# Patient Record
Sex: MALE | Race: WHITE | NOT HISPANIC OR LATINO | Employment: FULL TIME | URBAN - NONMETROPOLITAN AREA
[De-identification: names, ages, dates, MRNs, and addresses within clinical notes are randomized per-mention and may not be internally consistent; named-entity substitution may affect disease eponyms.]

---

## 2017-01-04 ENCOUNTER — OFFICE VISIT (OUTPATIENT)
Dept: FAMILY MEDICINE | Facility: CLINIC | Age: 27
End: 2017-01-04

## 2017-01-04 VITALS
SYSTOLIC BLOOD PRESSURE: 106 MMHG | DIASTOLIC BLOOD PRESSURE: 64 MMHG | WEIGHT: 215 LBS | BODY MASS INDEX: 32.7 KG/M2 | HEART RATE: 79 BPM | RESPIRATION RATE: 18 BRPM | TEMPERATURE: 97.7 F | OXYGEN SATURATION: 98 %

## 2017-01-04 DIAGNOSIS — J02.9 ACUTE PHARYNGITIS, UNSPECIFIED ETIOLOGY: ICD-10-CM

## 2017-01-04 DIAGNOSIS — R07.0 THROAT PAIN: Primary | ICD-10-CM

## 2017-01-04 DIAGNOSIS — F17.200 TOBACCO USE DISORDER: ICD-10-CM

## 2017-01-04 LAB
DEPRECATED S PYO AG THROAT QL EIA: NORMAL
MICRO REPORT STATUS: NORMAL
SPECIMEN SOURCE: NORMAL

## 2017-01-04 PROCEDURE — 87081 CULTURE SCREEN ONLY: CPT | Mod: 90 | Performed by: FAMILY MEDICINE

## 2017-01-04 PROCEDURE — 99000 SPECIMEN HANDLING OFFICE-LAB: CPT | Performed by: FAMILY MEDICINE

## 2017-01-04 PROCEDURE — 99213 OFFICE O/P EST LOW 20 MIN: CPT | Performed by: FAMILY MEDICINE

## 2017-01-04 PROCEDURE — 87880 STREP A ASSAY W/OPTIC: CPT | Performed by: FAMILY MEDICINE

## 2017-01-04 NOTE — PATIENT INSTRUCTIONS
Viral Pharyngitis (Sore Throat)    You (or your child, if your child is the patient) have pharyngitis (sore throat). This infection is caused by a virus. It can cause throat pain that is worse when swallowing, aching all over, headache, and fever. The infection may be spread by coughing, kissing, or touching others after touching your mouth or nose. Antibiotic medications do not work against viruses, so they are not used for treating this condition.  Home care    If your symptoms are severe, rest at home. Return to work or school when you feel well enough.     Drink plenty of fluids to avoid dehydration.    For children: Use acetaminophen for fever, fussiness or discomfort. In infants over six months of age, you may use ibuprofen instead of acetaminophen. (NOTE: If your child has chronic liver or kidney disease or ever had a stomach ulcer or GI bleeding, talk with your doctor before using these medicines.) (NOTE: Aspirin should never be used in anyone under 18 years of age who is ill with a fever. It may cause severe liver damage.)     For adults: You may use acetaminophen or ibuprofen to control pain or fever, unless another medicine was prescribed for this. (NOTE: If you have chronic liver or kidney disease or ever had a stomach ulcer or GI bleeding, talk with your doctor before using these medicines.)    Throat lozenges or numbing throat sprays can help reduce pain. Gargling with warm salt water will also help reduce throat pain. For this, dissolve 1/2 teaspoon of salt in 1 glass of warm water. To help soothe a sore throat, children can sip on juice or a popsicle. Children 5 years and older can also suck on a lollipop or hard candy.    Avoid salty or spicy foods, which can be irritating to the throat.  Follow-up care  Follow up with your healthcare provider or our staff if you are not improving over the next week.  When to seek medical advice  Call your healthcare provider right away if any of these  occur:    Fever as directed by your doctor.  For children, seek care if:    Your child is of any age and has repeated fevers above 104 F (40 C).    Your child is younger than 2 years of age and has a fever of 100.4 F (38 C) that continues for more than 1 day.    Your child is 2 years old or older and has a fever of 100.4 F (38 C) that continues for more than 3 days.    New or worsening ear pain, sinus pain, or headache    Painful lumps in the back of neck    Stiff neck    Lymph nodes are getting larger    Inability to swallow liquids, excessive drooling, or inability to open mouth wide due to throat pain    Signs of dehydration (very dark urine or no urine, sunken eyes, dizziness)    Trouble breathing or noisy breathing    Muffled voice    New rash    Child appears to be getting sicker    5933-0741 [employer]. [address] [place] [address]. All rights reserved. This information is not intended as a substitute for professional medical care. Always follow your healthcare professional's instructions.

## 2017-01-04 NOTE — MR AVS SNAPSHOT
After Visit Summary   1/4/2017    Hussain Warren    MRN: [id number]           Patient Information     Date Of Birth          1990        Visit Information        Provider Department      1/4/2017 3:00 PM Lorne Greenwood MD [facility]        Today's Diagnoses     Throat pain    -  1     Acute pharyngitis, unspecified etiology         Tobacco use disorder           Care Instructions      Viral Pharyngitis (Sore Throat)    You (or your child, if your child is the patient) have pharyngitis (sore throat). This infection is caused by a virus. It can cause throat pain that is worse when swallowing, aching all over, headache, and fever. The infection may be spread by coughing, kissing, or touching others after touching your mouth or nose. Antibiotic medications do not work against viruses, so they are not used for treating this condition.  Home care    If your symptoms are severe, rest at home. Return to work or school when you feel well enough.     Drink plenty of fluids to avoid dehydration.    For children: Use acetaminophen for fever, fussiness or discomfort. In infants over six months of age, you may use ibuprofen instead of acetaminophen. (NOTE: If your child has chronic liver or kidney disease or ever had a stomach ulcer or GI bleeding, talk with your doctor before using these medicines.) (NOTE: Aspirin should never be used in anyone under 18 years of age who is ill with a fever. It may cause severe liver damage.)     For adults: You may use acetaminophen or ibuprofen to control pain or fever, unless another medicine was prescribed for this. (NOTE: If you have chronic liver or kidney disease or ever had a stomach ulcer or GI bleeding, talk with your doctor before using these medicines.)    Throat lozenges or numbing throat sprays can help reduce pain. Gargling with warm salt water will also help reduce throat pain. For this, dissolve 1/2 teaspoon of salt in 1 glass of warm  water. To help soothe a sore throat, children can sip on juice or a popsicle. Children 5 years and older can also suck on a lollipop or hard candy.    Avoid salty or spicy foods, which can be irritating to the throat.  Follow-up care  Follow up with your healthcare provider or our staff if you are not improving over the next week.  When to seek medical advice  Call your healthcare provider right away if any of these occur:    Fever as directed by your doctor.  For children, seek care if:    Your child is of any age and has repeated fevers above 104 F (40 C).    Your child is younger than 2 years of age and has a fever of 100.4 F (38 C) that continues for more than 1 day.    Your child is 2 years old or older and has a fever of 100.4 F (38 C) that continues for more than 3 days.    New or worsening ear pain, sinus pain, or headache    Painful lumps in the back of neck    Stiff neck    Lymph nodes are getting larger    Inability to swallow liquids, excessive drooling, or inability to open mouth wide due to throat pain    Signs of dehydration (very dark urine or no urine, sunken eyes, dizziness)    Trouble breathing or noisy breathing    Muffled voice    New rash    Child appears to be getting sicker    3493-1477 [employer]. [address] [place] [address]. All rights reserved. This information is not intended as a substitute for professional medical care. Always follow your healthcare professional's instructions.              Follow-ups after your visit        Who to contact     If you have questions or need follow up information about today's clinic visit or your schedule please contact [facility] directly at [phone].  Normal or non-critical lab and imaging results will be communicated to you by MyChart, letter or phone within 4 business days after the clinic has received the results. If you do not hear from us within 7 days, please contact the clinic through [employer] or  "phone. If you have a critical or abnormal lab result, we will notify you by phone as soon as possible.  Submit refill requests through [employer] or call your pharmacy and they will forward the refill request to us. Please allow 3 business days for your refill to be completed.          Additional Information About Your Visit        [facility] [employer] Information     [employer] lets you send messages to your doctor, view your test results, renew your prescriptions, schedule appointments and more. To sign up, go to [email or web address] . Click on \"Log in\" on the left side of the screen, which will take you to the Welcome page. Then click on \"Sign up Now\" on the right side of the page.     You will be asked to enter the access code listed below, as well as some personal information. Please follow the directions to create your username and password.     Your access code is: [email or web address]  Expires: 2017  3:25 PM     Your access code will  in 90 days. If you need help or a new code, please call your [facility] or [phone].        Care EveryWhere ID     This is your Care EveryWhere ID. This could be used by other organizations to access your [facility] medical records  [id number]        Your Vitals Were     Pulse Temperature Respirations Pulse Oximetry          79 97.7  F (36.5  C) (Tympanic) 18 98%         Blood Pressure from Last 3 Encounters:   17 106/64   16 138/82   11/19/15 127/76    Weight from Last 3 Encounters:   17 215 lb (97.523 kg)   16 212 lb (96.163 kg)   11/19/15 202 lb (91.627 kg)              We Performed the Following     Beta strep group A culture     Strep, Rapid Screen        Primary Care Provider Office Phone # Fax #    Darnell Lockhart [phone]       [facility] [address] [facility] [address]        Thank you!     Thank you for choosing [facility]  for your care. Our goal is always to provide you with excellent " care. Hearing back from our patients is one way we can continue to improve our services. Please take a few minutes to complete the written survey that you may receive in the mail after your visit with us. Thank you!             Your Updated Medication List - Protect others around you: Learn how to safely use, store and throw away your medicines at www.disposemymeds.org.      Notice  As of 1/4/2017  3:25 PM    You have not been prescribed any medications.

## 2017-01-04 NOTE — PROGRESS NOTES
SUBJECTIVE:                                                    Hussain Warren is a 26 year old male who presents to clinic today for the following health issues:      RESPIRATORY SYMPTOMS      Duration: 5 days    Description  sore throat, cough, fever and chills, body aches    Severity: moderate    Accompanying signs and symptoms: None    History (predisposing factors):  none    Precipitating or alleviating factors: None    Therapies tried and outcome:  OTC cold and cough medication         Problem list and histories reviewed & adjusted, as indicated.  Additional history: as documented    Patient Active Problem List   Diagnosis     Other acne     Anxiety     Insomnia     Bipolar 1 disorder (H)     ADHD (attention deficit hyperactivity disorder)     Tobacco use     Elevated BP     CARDIOVASCULAR SCREENING; LDL GOAL LESS THAN 160     Corneal ulcer of left eye     Past Surgical History   Procedure Laterality Date     Orthopedic surgery       boxer's fracture at age 15       Social History   Substance Use Topics     Smoking status: Current Some Day Smoker -- 0.40 packs/day     Types: Cigarettes     Smokeless tobacco: Never Used     Alcohol Use: Yes      Comment: Occ     Family History   Problem Relation Age of Onset     Hypertension Father      gout     DIABETES Paternal Grandmother      type 2, Alzheimers     Hypertension Paternal Grandfather      DIABETES Paternal Uncle      Hypertension Paternal Uncle      Hypertension Paternal Aunt      Hypertension Paternal Aunt      Breast Cancer Paternal Aunt          No current outpatient prescriptions on file.     No Known Allergies  Recent Labs   Lab Test  02/24/15   0947  03/04/13   1236   CR  1.14   --    GFRESTIMATED  78   --    GFRESTBLACK  >90   GFR Calc     --    POTASSIUM  4.5   --    TSH  2.14  1.55      BP Readings from Last 3 Encounters:   01/04/17 106/64   08/16/16 138/82   11/19/15 127/76    Wt Readings from Last 3 Encounters:   01/04/17 215 lb  (97.523 kg)   08/16/16 212 lb (96.163 kg)   11/19/15 202 lb (91.627 kg)                  Problem list, Medication list, Allergies, and Medical/Social/Surgical histories reviewed in [facility] and updated as appropriate.    ROS:  Constitutional, HEENT, cardiovascular, pulmonary, gi and gu systems are negative, except as otherwise noted.    OBJECTIVE:                                                    /64 mmHg  Pulse 79  Temp(Src) 97.7  F (36.5  C) (Tympanic)  Resp 18  Wt 215 lb (97.523 kg)  SpO2 98%  Body mass index is 32.7 kg/(m^2).  GENERAL: alert and no distress  EYES: Eyes grossly normal to inspection, PERRL and conjunctivae and sclerae normal  HENT: normal cephalic/atraumatic, ear canals and TM's normal, nose and mouth without ulcers or lesions, oral mucous membranes moist and oropharxnx crowded  NECK: no adenopathy, no asymmetry, masses, or scars and thyroid normal to palpation  RESP: lungs clear to auscultation - no rales, rhonchi or wheezes  CV: regular rate and rhythm, normal S1 S2, no S3 or S4, no murmur, click or rub, no peripheral edema and peripheral pulses strong  ABDOMEN: soft, nontender, no hepatosplenomegaly, no masses and bowel sounds normal  MS: no gross musculoskeletal defects noted, no edema    Diagnostic Test Results:  Results for orders placed or performed in visit on 01/04/17 (from the past 24 hour(s))   Strep, Rapid Screen   Result Value Ref Range    Specimen Description Throat     Rapid Strep A Screen       NEGATIVE: No Group A streptococcal antigen detected by immunoassay, await   culture report.      Micro Report Status FINAL 01/04/2017         ASSESSMENT/PLAN:                                                          ICD-10-CM    1. Throat pain R07.0 Strep, Rapid Screen     Beta strep group A culture   2. Acute pharyngitis, unspecified etiology J02.9    3. Tobacco use disorder F17.200        Discussed in detail differentials and further management. Symptoms are likely secondary to  viral infection. Recommended well hydration, over-the-counter analgesia, warm fluids and saline gargles. Smoking cessation counseling provided, not ready to quit currently. Patient understood and in agreement with the above plan. All questions are answered. Follow-up if symptoms persist or worsen.      Lorne Greenwood MD  [facility]

## 2017-01-04 NOTE — NURSING NOTE
"Chief Complaint   Patient presents with     URI       Initial /64 mmHg  Pulse 79  Temp(Src) 97.7  F (36.5  C) (Tympanic)  Resp 18  Wt 215 lb (97.523 kg)  SpO2 98% Estimated body mass index is 32.7 kg/(m^2) as calculated from the following:    Height as of 8/16/16: 5' 8\" (1.727 m).    Weight as of this encounter: 215 lb (97.523 kg).  BP completed using cuff size: large    "

## 2017-01-05 ENCOUNTER — TELEPHONE (OUTPATIENT)
Dept: FAMILY MEDICINE | Facility: CLINIC | Age: 27
End: 2017-01-05

## 2017-01-05 NOTE — TELEPHONE ENCOUNTER
Reason for Call:  Other note for work    Detailed comments: He was seen yesterday by Dr Greenwood.  He needs a note saying that he is okay to return to work today.  He needs the note faxed to [phone].    Phone Number Patient can be reached at: Home number on file [phone] (home)    Best Time: any    Can we leave a detailed message on this number? YES    Call taken on 1/5/2017 at 9:37 AM by Wendy Gabriel

## 2017-01-05 NOTE — Clinical Note
[place] [address] [facility] [address]  Phone: [phone]  Fax: [phone]    January 5, 2017    Hussain Warren  [address]  [facility] [address]            To whom it may concern,          Hussain Warren was seen in clinic 01-04-17 for illness. Per his discretion/ symptoms    he may return to work today, 01-05-17.             Sincerely,      Lorne Greenwood MD/ francine

## 2017-01-05 NOTE — TELEPHONE ENCOUNTER
Per 01-04-16 OV-       ICD-10-CM      1.  Throat pain  R07.0  Strep, Rapid Screen        Beta strep group A culture    2.  Acute pharyngitis, unspecified etiology  J02.9      3.  Tobacco use disorder  F17.200          Discussed in detail differentials and further management. Symptoms are likely secondary to viral infection. Recommended well hydration, over-the-counter analgesia, warm fluids and saline gargles. Smoking cessation counseling provided, not ready to quit currently. Patient understood and in agreement with the above plan. All questions are answered. Follow-up if symptoms persist or worsen.      Lorne Greenwood MD  [facility]               Spoke with pt who states still feeling ill however needed to return to work today financially.  Return to work letter issued, signed by MD.  CSS to fax letter to employer.  JASPREET Romero RN

## 2017-01-06 LAB
BACTERIA SPEC CULT: NORMAL
MICRO REPORT STATUS: NORMAL
SPECIMEN SOURCE: NORMAL

## 2017-04-28 DIAGNOSIS — Z31.41 FERTILITY TESTING: Primary | ICD-10-CM

## 2019-01-19 ENCOUNTER — HOSPITAL ENCOUNTER (EMERGENCY)
Facility: CLINIC | Age: 29
Discharge: HOME | End: 2019-01-19
Attending: PHYSICIAN ASSISTANT | Admitting: PHYSICIAN ASSISTANT
Payer: MEDICAID

## 2019-01-19 ENCOUNTER — APPOINTMENT (OUTPATIENT)
Dept: GENERAL RADIOLOGY | Facility: CLINIC | Age: 29
End: 2019-01-19
Payer: MEDICAID

## 2019-01-19 VITALS
RESPIRATION RATE: 18 BRPM | OXYGEN SATURATION: 98 % | DIASTOLIC BLOOD PRESSURE: 85 MMHG | TEMPERATURE: 98.3 F | SYSTOLIC BLOOD PRESSURE: 142 MMHG

## 2019-01-19 DIAGNOSIS — S92.351A CLOSED DISPLACED FRACTURE OF FIFTH METATARSAL BONE OF RIGHT FOOT, INITIAL ENCOUNTER: ICD-10-CM

## 2019-01-19 PROCEDURE — 73630 X-RAY EXAM OF FOOT: CPT | Mod: RT

## 2019-01-19 PROCEDURE — 28470 CLTX METATARSAL FX WO MNP EA: CPT | Mod: T9 | Performed by: PHYSICIAN ASSISTANT

## 2019-01-19 PROCEDURE — 99214 OFFICE O/P EST MOD 30 MIN: CPT | Mod: 25 | Performed by: PHYSICIAN ASSISTANT

## 2019-01-19 PROCEDURE — 28470 CLTX METATARSAL FX WO MNP EA: CPT | Mod: 54 | Performed by: PHYSICIAN ASSISTANT

## 2019-01-19 PROCEDURE — G0463 HOSPITAL OUTPT CLINIC VISIT: HCPCS | Mod: 25 | Performed by: PHYSICIAN ASSISTANT

## 2019-01-19 RX ORDER — HYDROCODONE BITARTRATE AND ACETAMINOPHEN 5; 325 MG/1; MG/1
1-2 TABLET ORAL EVERY 6 HOURS PRN
Qty: 10 TABLET | Refills: 0 | Status: SHIPPED | OUTPATIENT
Start: 2019-01-19 | End: 2019-02-18

## 2019-01-19 ASSESSMENT — ENCOUNTER SYMPTOMS
CONSTITUTIONAL NEGATIVE: 1
NEUROLOGICAL NEGATIVE: 1

## 2019-01-19 NOTE — ED PROVIDER NOTES
History     Chief Complaint   Patient presents with     Foot Pain     right, was kicking snow off car     HPI  Hussain Warren is a 28 year old male who presents with complaints of right foot pain and swelling today.  Patient states he attempted to kick off a chunk of snow/ice off of his significant other's vehicle today when he developed sudden pain to the lateral aspect of his right foot.  His pain has worsened throughout the day along with associated swelling of the area.  His foot pain is worse with weightbearing.        Allergies:  No Known Allergies    Problem List:    Patient Active Problem List    Diagnosis Date Noted     Corneal ulcer of left eye 06/24/2015     Priority: Medium     Tobacco use 02/24/2015     Priority: Medium     Elevated BP 02/24/2015     Priority: Medium     CARDIOVASCULAR SCREENING; LDL GOAL LESS THAN 160 02/24/2015     Priority: Medium     Insomnia 03/04/2013     Priority: Medium     Bipolar 1 disorder (H) 03/04/2013     Priority: Medium     ADHD (attention deficit hyperactivity disorder) 03/04/2013     Priority: Medium     Anxiety 02/28/2011     Priority: Medium     Other acne 12/08/2005     Priority: Medium        Past Medical History:    Past Medical History:   Diagnosis Date     Back pain      Depressive disorder      Other acne        Past Surgical History:    Past Surgical History:   Procedure Laterality Date     ORTHOPEDIC SURGERY      boxer's fracture at age 15       Family History:    Family History   Problem Relation Age of Onset     Hypertension Father         gout     Diabetes Paternal Grandmother         type 2, Alzheimers     Hypertension Paternal Grandfather      Diabetes Paternal Uncle      Hypertension Paternal Uncle      Hypertension Paternal Aunt      Hypertension Paternal Aunt      Breast Cancer Paternal Aunt        Social History:  Marital Status:  Single [1]  Social History     Tobacco Use     Smoking status: Current Some Day Smoker     Packs/day: 0.40     Types:  Cigarettes     Smokeless tobacco: Never Used   Substance Use Topics     Alcohol use: Yes     Comment: Occ     Drug use: No        Medications:      HYDROcodone-acetaminophen (NORCO) 5-325 MG tablet         Review of Systems   Constitutional: Negative.    Musculoskeletal:        Right foot pain and swelling   Skin: Negative.    Neurological: Negative.    All other systems reviewed and are negative.      Physical Exam   BP: 142/85  Heart Rate: 97  Temp: 98.3  F (36.8  C)  Resp: 18  SpO2: 98 %      Physical Exam   Constitutional: He appears well-developed and well-nourished. No distress.   HENT:   Head: Normocephalic and atraumatic.   Eyes: Conjunctivae are normal.   Neck: Neck supple.   Cardiovascular: Intact distal pulses.   Pulmonary/Chest: Effort normal.   Musculoskeletal:        Right ankle: Normal. He exhibits normal range of motion, no swelling and no ecchymosis. No tenderness.        Right foot: There is decreased range of motion, tenderness, bony tenderness and swelling. There is normal capillary refill, no crepitus, no deformity and no laceration.   Diffuse tenderness of right foot extending from right fifth toe and especially along fifth metatarsal.  There is associated swelling of his foot.  No overlying skin changes.   Neurological: He is alert. He has normal strength. No sensory deficit.   Skin: Skin is warm and dry. No rash noted.       ED Course        Procedures    Results for orders placed or performed during the hospital encounter of 01/19/19 (from the past 24 hour(s))   Foot  XR, G/E 3 views, right    Narrative    RIGHT FOOT THREE OR MORE VIEWS 1/19/2019 6:09 PM     HISTORY: Kicked ice, tenderness along fifth toe and metatarsal.    COMPARISON: 11/24/2018      Impression    IMPRESSION: An acute nondisplaced fracture is present involving the  base of the fifth metatarsal extending into the tarsometatarsal joint.  Mild associated soft tissue swelling is present. No other fractures  are  identified.    MILY IBARRA MD       Medications - No data to display    Assessments & Plan (with Medical Decision Making)     Pt is a 28 year old male who presents with complaints of right foot pain and swelling today.  Patient states he attempted to kick off a chunk of snow/ice off of his significant other's vehicle today when he developed sudden pain to the lateral aspect of his right foot.  His pain has worsened throughout the day along with associated swelling of the area.  His foot pain is worse with weightbearing.  Pt is afebrile on arrival.  Exam as above.  X-rays of right foot show an acute nondisplaced fracture involving the base of the fifth metatarsal.  Discussed results with patient.  Patient was placed in a cam boot with instructions to be non-weightbearing with crutches until evaluated by orthopedics.  Return precautions were reviewed.  Hand-outs were provided.    Patient was sent with [facility] and was instructed to follow-up with orthopedics in 3-5 days for continued care and management (referral was made).  He is to return to the ED for persistent and/or worsening symptoms.  Patient expressed understanding of the diagnosis and plan and was discharged home in good condition.    I have reviewed the nursing notes.    I have reviewed the findings, diagnosis, plan and need for follow up with the patient.       Medication List      Started    HYDROcodone-acetaminophen 5-325 MG tablet  Commonly known as:  NORCO  1-2 tablets, Oral, EVERY 6 HOURS PRN            Final diagnoses:   Closed displaced fracture of fifth metatarsal bone of right foot, initial encounter       1/19/2019   [facility] EMERGENCY DEPARTMENT      Disclaimer:  This note consists of symbols derived from keyboarding, dictation and/or voice recognition software.  As a result, there may be errors in the script that have gone undetected.  Please consider this when interpreting information found in this chart.     Natalia Sequeira,  KALI  01/19/19 1948

## 2019-01-19 NOTE — ED AVS SNAPSHOT
[facility] Emergency Department  [address] [facility] [address]  Phone:  [phone]  Fax:  [phone]                                    Hussain Warren   MRN: [id number]    Department:  [facility] Emergency Department   Date of Visit:  1/19/2019           After Visit Summary Signature Page    I have received my discharge instructions, and my questions have been answered. I have discussed any challenges I see with this plan with the nurse or doctor.    ..........................................................................................................................................  Patient/Patient Representative Signature      ..........................................................................................................................................  Patient Representative Print Name and Relationship to Patient    ..................................................               ................................................  Date                                   Time    ..........................................................................................................................................  Reviewed by Signature/Title    ...................................................              ..............................................  Date                                               Time          22EPIC Rev 08/18

## 2019-01-23 ENCOUNTER — OFFICE VISIT (OUTPATIENT)
Dept: FAMILY MEDICINE | Facility: CLINIC | Age: 29
End: 2019-01-23
Payer: MEDICAID

## 2019-01-23 VITALS
WEIGHT: 250 LBS | HEART RATE: 87 BPM | OXYGEN SATURATION: 96 % | HEIGHT: 71 IN | DIASTOLIC BLOOD PRESSURE: 88 MMHG | SYSTOLIC BLOOD PRESSURE: 148 MMHG | TEMPERATURE: 97.4 F | RESPIRATION RATE: 24 BRPM | BODY MASS INDEX: 35 KG/M2

## 2019-01-23 DIAGNOSIS — S92.354D CLOSED NONDISPLACED FRACTURE OF FIFTH METATARSAL BONE OF RIGHT FOOT WITH ROUTINE HEALING, SUBSEQUENT ENCOUNTER: Primary | ICD-10-CM

## 2019-01-23 PROCEDURE — 99213 OFFICE O/P EST LOW 20 MIN: CPT | Performed by: FAMILY MEDICINE

## 2019-01-23 RX ORDER — HYDROCODONE BITARTRATE AND ACETAMINOPHEN 5; 325 MG/1; MG/1
1 TABLET ORAL EVERY 6 HOURS PRN
Qty: 12 TABLET | Refills: 0 | Status: SHIPPED | OUTPATIENT
Start: 2019-01-23 | End: 2019-02-18

## 2019-01-23 SDOH — HEALTH STABILITY: MENTAL HEALTH: HOW MANY STANDARD DRINKS CONTAINING ALCOHOL DO YOU HAVE ON A TYPICAL DAY?: 1 OR 2

## 2019-01-23 SDOH — HEALTH STABILITY: MENTAL HEALTH: HOW OFTEN DO YOU HAVE A DRINK CONTAINING ALCOHOL?: MONTHLY OR LESS

## 2019-01-23 ASSESSMENT — MIFFLIN-ST. JEOR: SCORE: 2116.12

## 2019-01-23 ASSESSMENT — PAIN SCALES - GENERAL: PAINLEVEL: EXTREME PAIN (8)

## 2019-01-23 NOTE — PROGRESS NOTES
SUBJECTIVE:   Hussain Warren is a 28 year old male who presents to clinic today for the following health issues:      ED/UC Followup:    Facility:  [facility]  Date of visit: 2019  Reason for visit: Closed displaced fracture of fifth metatarsal bone of right foot, initial encounter   Current Status:pain poorly controlled          Problem list and histories reviewed & adjusted, as indicated.  Additional history: as documented    Patient Active Problem List   Diagnosis     Other acne     Anxiety     Insomnia     Bipolar 1 disorder (H)     ADHD (attention deficit hyperactivity disorder)     Tobacco use     Elevated BP     CARDIOVASCULAR SCREENING; LDL GOAL LESS THAN 160     Corneal ulcer of left eye     Past Surgical History:   Procedure Laterality Date     ORTHOPEDIC SURGERY      boxer's fracture at age 15       Social History     Tobacco Use     Smoking status: Former Smoker     Packs/day: 0.40     Types: Cigarettes     Last attempt to quit: 2018     Years since quittin.4     Smokeless tobacco: Never Used   Substance Use Topics     Alcohol use: Yes     Frequency: Monthly or less     Drinks per session: 1 or 2     Comment: Occ     Family History   Problem Relation Age of Onset     Hypertension Father         gout     Diabetes Paternal Grandmother         type 2, Alzheimers     Hypertension Paternal Grandfather      Diabetes Paternal Uncle      Hypertension Paternal Uncle      Hypertension Paternal Aunt      Hypertension Paternal Aunt      Breast Cancer Paternal Aunt          No current outpatient medications on file.     No Known Allergies  Recent Labs   Lab Test 02/24/15  0947 13  1236   CR 1.14  --    GFRESTIMATED 78  --    GFRESTBLACK >90   GFR Calc    --    POTASSIUM 4.5  --    TSH 2.14 1.55      BP Readings from Last 3 Encounters:   19 148/88   19 142/85   17 106/64    Wt Readings from Last 3 Encounters:   19 113.4 kg (250 lb)   17 97.5 kg  "(215 lb)   08/16/16 96.2 kg (212 lb)                  Labs reviewed in EPIC    Reviewed and updated as needed this visit by clinical staff       Reviewed and updated as needed this visit by Provider         ROS:  Constitutional, HEENT, cardiovascular, pulmonary, gi and gu systems are negative, except as otherwise noted.    OBJECTIVE:     /88   Pulse 87   Temp 97.4  F (36.3  C) (Tympanic)   Resp 24   Ht 1.803 m (5' 11\")   Wt 113.4 kg (250 lb)   SpO2 96%   BMI 34.87 kg/m     Body mass index is 34.87 kg/m .  GENERAL: alert and no distress  NECK: no adenopathy, no asymmetry, masses, or scars and thyroid normal to palpation  RESP: lungs clear to auscultation - no rales, rhonchi or wheezes  CV: regular rate and rhythm, normal S1 S2, no S3 or S4, no murmur, click or rub, no peripheral edema and peripheral pulses strong  ABDOMEN: soft, nontender, no hepatosplenomegaly, no masses and bowel sounds normal  MS: right foot: swollen and tender laterally, no significant skin discoloration noted, pulses 3+, sensation to touch and pressure intact  SKIN: normal exam     RIGHT FOOT THREE OR MORE VIEWS 1/19/2019 6:09 PM      HISTORY: Kicked ice, tenderness along fifth toe and metatarsal.     COMPARISON: 11/24/2018                                                                      IMPRESSION: An acute nondisplaced fracture is present involving the  base of the fifth metatarsal extending into the tarsometatarsal joint.  Mild associated soft tissue swelling is present. No other fractures  are identified.    ASSESSMENT/PLAN:         ICD-10-CM    1. Closed nondisplaced fracture of fifth metatarsal bone of right foot with routine healing, subsequent encounter S92.354D HYDROcodone-acetaminophen (NORCO) 5-325 MG tablet     28-year-old male presents with poorly controlled right foot pain, sustained a closed nondisplaced fifth metatarsal bone fracture of right foot, was seen in ER on January 19, 2019.  Physical examination as " described above.  Norco refilled, common side effects discussed.  Suggested to continue the Cam walker boot, ibuprofen, rest and minimal weightbearing.  Patient has an appointment with orthopedic tomorrow.  Written information provided.  All questions answered.      Patient Instructions     Patient Education     Foot Fracture  You have a broken bone (fracture) in your foot. This will cause pain, swelling, and often bruising. It will usually take about 4 to 8 weeks to heal. A foot fracture may be treated with a special shoe, splint, cast, or boot.  Home care  Follow these guidelines when caring for yourself at home:    You may be given a splint, cast, shoe, or boot to keep the injured area from moving. Unless you were told otherwise, use crutches or a walker. Don t put weight on the injured foot until your health care provider says you can do so. (You can rent crutches and a walker at many pharmacies and surgical or orthopedic supply stores.) Don t put weight on a splint, or it will break.    Keep your leg elevated to reduce pain and swelling. When sleeping, put a pillow under the injured leg. When sitting, support the injured leg so it is above your waist. This is very important during the first 2 days (48 hours).    Put an ice pack on the injured area. Do this for 20 minutes every 1 to 2 hours the first day for pain relief. You can make an ice pack by wrapping a plastic bag of ice cubes in a thin towel. As the ice melts, be careful that the splint, cast, boot, or shoe doesn t get wet. You can place the ice pack directly over the splint or cast. Unless told otherwise, you can open the boot or shoe to apply the ice pack. Continue using the ice pack 3 to 4 times a day for the next 2 days. Then use the ice pack as needed to ease pain and swelling.    Keep the splint, cast, boot, or shoe dry. When bathing, protect it with a large plastic bag, rubber-banded at the top end. If a fiberglass splint or cast or boot gets wet,  you can dry it with a hair dryer. Unless told otherwise, you can take off the boot or shoe to bathe.    You may use acetaminophen or ibuprofen to control pain, unless another pain medicine was prescribed. If you have chronic liver or kidney disease, talk with your healthcare provider before using these medicines. Also talk with your provider if you ve had a stomach ulcer or gastrointestinal bleeding.    Don t put creams or objects under the cast if you have itching.  Follow-up care  Follow up with your healthcare provider, or as advised. This is to make sure the bone is healing the way it should. If you were given a splint, it may be changed to a cast or boot at your follow-up visit.  X-rays may be taken. You will be told of any new findings that may affect your care.  When to seek medical advice  Call your healthcare provider right away if any of these occur:    The cast or splint cracks    The plaster cast or splint becomes wet or soft    The fiberglass cast or splint stays wet for more than 24 hours    Bad odor from the cast or wound fluid stains the cast    Tightness or pain under the cast or splint gets worse    Toes become swollen, cold, blue, numb, or tingly    You can t move your toes    Skin around cast or splint becomes red    Fever of 100.4 F (38 C) or higher, or as directed by your healthcare provider  Date Last Reviewed: 2/1/2017 2000-2018 [employer]. [address] [place] [address]. All rights reserved. This information is not intended as a substitute for professional medical care. Always follow your healthcare professional's instructions.               Lorne Greenwood MD  [facility]

## 2019-01-23 NOTE — NURSING NOTE
"Chief Complaint   Patient presents with     ER F/U       Initial There were no vitals taken for this visit. Estimated body mass index is 32.69 kg/m  as calculated from the following:    Height as of 8/16/16: 1.727 m (5' 8\").    Weight as of 1/4/17: 97.5 kg (215 lb).    Patient presents to the clinic using No DME    Health Maintenance that is potentially due pending provider review:  NONE    n/a    Is there anyone who you would like to be able to receive your results? not asked  If yes have patient fill out MURIEL    "

## 2019-01-23 NOTE — PATIENT INSTRUCTIONS
Patient Education     Foot Fracture  You have a broken bone (fracture) in your foot. This will cause pain, swelling, and often bruising. It will usually take about 4 to 8 weeks to heal. A foot fracture may be treated with a special shoe, splint, cast, or boot.  Home care  Follow these guidelines when caring for yourself at home:    You may be given a splint, cast, shoe, or boot to keep the injured area from moving. Unless you were told otherwise, use crutches or a walker. Don t put weight on the injured foot until your health care provider says you can do so. (You can rent crutches and a walker at many pharmacies and surgical or orthopedic supply stores.) Don t put weight on a splint, or it will break.    Keep your leg elevated to reduce pain and swelling. When sleeping, put a pillow under the injured leg. When sitting, support the injured leg so it is above your waist. This is very important during the first 2 days (48 hours).    Put an ice pack on the injured area. Do this for 20 minutes every 1 to 2 hours the first day for pain relief. You can make an ice pack by wrapping a plastic bag of ice cubes in a thin towel. As the ice melts, be careful that the splint, cast, boot, or shoe doesn t get wet. You can place the ice pack directly over the splint or cast. Unless told otherwise, you can open the boot or shoe to apply the ice pack. Continue using the ice pack 3 to 4 times a day for the next 2 days. Then use the ice pack as needed to ease pain and swelling.    Keep the splint, cast, boot, or shoe dry. When bathing, protect it with a large plastic bag, rubber-banded at the top end. If a fiberglass splint or cast or boot gets wet, you can dry it with a hair dryer. Unless told otherwise, you can take off the boot or shoe to bathe.    You may use acetaminophen or ibuprofen to control pain, unless another pain medicine was prescribed. If you have chronic liver or kidney disease, talk with your healthcare provider  before using these medicines. Also talk with your provider if you ve had a stomach ulcer or gastrointestinal bleeding.    Don t put creams or objects under the cast if you have itching.  Follow-up care  Follow up with your healthcare provider, or as advised. This is to make sure the bone is healing the way it should. If you were given a splint, it may be changed to a cast or boot at your follow-up visit.  X-rays may be taken. You will be told of any new findings that may affect your care.  When to seek medical advice  Call your healthcare provider right away if any of these occur:    The cast or splint cracks    The plaster cast or splint becomes wet or soft    The fiberglass cast or splint stays wet for more than 24 hours    Bad odor from the cast or wound fluid stains the cast    Tightness or pain under the cast or splint gets worse    Toes become swollen, cold, blue, numb, or tingly    You can t move your toes    Skin around cast or splint becomes red    Fever of 100.4 F (38 C) or higher, or as directed by your healthcare provider  Date Last Reviewed: 2/1/2017 2000-2018 [employer]. [address] [place] [address], [place] [address]. All rights reserved. This information is not intended as a substitute for professional medical care. Always follow your healthcare professional's instructions.

## 2019-01-24 ENCOUNTER — OFFICE VISIT (OUTPATIENT)
Dept: PODIATRY | Facility: CLINIC | Age: 29
End: 2019-01-24
Payer: MEDICAID

## 2019-01-24 VITALS
HEIGHT: 71 IN | DIASTOLIC BLOOD PRESSURE: 79 MMHG | SYSTOLIC BLOOD PRESSURE: 147 MMHG | WEIGHT: 250 LBS | HEART RATE: 93 BPM | BODY MASS INDEX: 35 KG/M2

## 2019-01-24 DIAGNOSIS — S92.354A CLOSED NONDISPLACED FRACTURE OF FIFTH METATARSAL BONE OF RIGHT FOOT, INITIAL ENCOUNTER: Primary | ICD-10-CM

## 2019-01-24 PROCEDURE — 99203 OFFICE O/P NEW LOW 30 MIN: CPT | Performed by: PODIATRIST

## 2019-01-24 ASSESSMENT — PAIN SCALES - GENERAL: PAINLEVEL: MODERATE PAIN (4)

## 2019-01-24 ASSESSMENT — MIFFLIN-ST. JEOR: SCORE: 2116.12

## 2019-01-24 NOTE — PROGRESS NOTES
PATIENT HISTORY:  Hussain Warren is a 28 year old male who presents to clinic with a chief complaint of a painful right foot.  The patient relates the pain is located on the outside  aspect on the right foot.  The patient relates injuring the foot on Saturday while kicking ice off the car.  The patient was seen by the ER with x-rays revealing a nondisplaced fracture at the base of the fifth metatarsal on the right foot.  The patient was splinted and instructed on non weight bearing with crutches.    REVIEW OF SYSTEMS:  Constitutional, HEENT, cardiovascular, pulmonary, GI, , musculoskeletal, neuro, skin, endocrine and psych systems are negative, except as otherwise noted.     PAST MEDICAL HISTORY:   Past Medical History:   Diagnosis Date     Back pain      Depressive disorder      Other acne         PAST SURGICAL HISTORY:   Past Surgical History:   Procedure Laterality Date     ORTHOPEDIC SURGERY      boxer's fracture at age 15        MEDICATIONS:   Current Outpatient Medications:      HYDROcodone-acetaminophen (NORCO) 5-325 MG tablet, Take 1 tablet by mouth every 6 hours as needed for pain, Disp: 12 tablet, Rfl: 0     ALLERGIES:  No Known Allergies     SOCIAL HISTORY:   Social History     Socioeconomic History     Marital status: Single     Spouse name: Not on file     Number of children: Not on file     Years of education: Not on file     Highest education level: Not on file   Social Needs     Financial resource strain: Not on file     Food insecurity - worry: Not on file     Food insecurity - inability: Not on file     Transportation needs - medical: Not on file     Transportation needs - non-medical: Not on file   Occupational History     Not on file   Tobacco Use     Smoking status: Former Smoker     Packs/day: 0.40     Types: Cigarettes     Last attempt to quit: 2018     Years since quittin.4     Smokeless tobacco: Never Used   Substance and Sexual Activity     Alcohol use: Yes     Frequency:  Monthly or less     Drinks per session: 1 or 2     Comment: Occ     Drug use: No     Sexual activity: Yes   Other Topics Concern      Service No     Blood Transfusions No     Caffeine Concern No     Comment: none during wrestling season     Occupational Exposure No     Hobby Hazards Yes     Comment: high school wrestler     Sleep Concern No     Stress Concern No     Weight Concern No     Special Diet Yes     Comment: wrestler     Back Care No     Exercise Not Asked     Bike Helmet Yes     Seat Belt Yes     Self-Exams Not Asked     Parent/sibling w/ CABG, MI or angioplasty before 65F 55M? No   Social History Narrative     Not on file        FAMILY HISTORY:   Family History   Problem Relation Age of Onset     Hypertension Father         gout     Diabetes Paternal Grandmother         type 2, Alzheimers     Hypertension Paternal Grandfather      Diabetes Paternal Uncle      Hypertension Paternal Uncle      Hypertension Paternal Aunt      Hypertension Paternal Aunt      Breast Cancer Paternal Aunt         EXAM:Vitals: There were no vitals taken for this visit.  BMI= There is no height or weight on file to calculate BMI.      General appearance: Patient is alert and fully cooperative with history & exam.  No sign of distress is noted during the visit.     Psychiatric: Affect is pleasant & appropriate.  Patient appears motivated to improve health.     Respiratory: Breathing is regular & unlabored while sitting.     HEENT: Hearing is intact to spoken word.  Speech is clear.  No gross evidence of visual impairment that would impact ambulation.     Dermatologic: Skin is intact to both lower extremities without significant lesions, rash or abrasion.  No paronychia or evidence of soft tissue infection is noted.     Vascular: DP & PT pulses are intact & regular bilaterally.  No significant edema or varicosities noted.  CFT and skin temperature is normal to both lower extremities.     Neurologic: Lower extremity sensation  is intact to light touch.  No evidence of weakness or contracture in the lower extremities.  No evidence of neuropathy.     Musculoskeletal: Patient is non-ambulatory with crutches.  No gross ankle deformity noted.  No foot or ankle joint effusion is noted.    One notes positive edema, positive ecchymosis.  One notes pain with palpation over the lateral aspect overlying the base of the fifth metatarsal  on the right.    Radiograph review of previous films including non weightbearing AP, lateral and medial oblique views of the right foot reveals an apparent closed nondisplaced fracture of the base of the fifth metatarsal.  All joint margins appear stable.  There is no apparent tumor formation noted.  There is no evidence of foreign body.    Assessment:  1.  Closed nondisplaced fracture of the base of the fifth metatarsal  of the right foot.    Plan:  I have explained to Hussain  about the conditions.  We discussed both conservative and surgical treatment options with all associated risks and benefits.  At this time, I do not believe there is need for surgery.  The patient will return to the office in one month for reevaluation and repeat x-rays.      Disclaimer: This note consists of symbols derived from keyboarding, dictation and/or voice recognition software. As a result, there may be errors in the script that have gone undetected. Please consider this when interpreting information found in this chart.       JEANE Capps D.P.M., [email or web address].

## 2019-01-24 NOTE — PATIENT INSTRUCTIONS
TOE & METATARSAL FRACTURES  The structure of the foot is complex, consisting of bones, muscles, tendons, and other soft tissues. Of the 26 bones in the foot, 19 are toe bones (phalanges) and metatarsal bones (the long bones in the midfoot). Fractures of the toe and metatarsal bones are common and require evaluation by a specialist. A foot and ankle surgeon should be seen for proper diagnosis and treatment, even if initial treatment has been received in an emergency room.  A fracture is a break in the bone. Fractures can be divided into two categories: traumatic fractures and stress fractures.  TRAUMATIC FRACTURES (also called acute fractures) are caused by a direct blow or impact, such as seriously stubbing your toe. Traumatic fractures can be displaced or non-displaced. If the fracture is displaced, the bone is broken in such a way that it has changed in position (dislocated).  Signs and symptoms of a traumatic fracture include:  You may hear a sound at the time of the break.    Pinpoint pain  (pain at the place of impact) at the time the fracture occurs and perhaps for a few hours later, but often the pain goes away after several hours.   Crooked or abnormal appearance of the toe.   Bruising and swelling the next day.   It is not true that  if you can walk on it, it s not broken.  Evaluation by a foot and ankle surgeon is always recommended.   STRESS FRACTURES are tiny, hairline breaks that are usually caused by repetitive stress. Stress fractures often afflict athletes who, for example, too rapidly increase their running mileage. They can also be caused by an abnormal foot structure, deformities, or osteoporosis. Improper footwear may also lead to stress fractures. Stress fractures should not be ignored. They require proper medical attention to heal correctly.  Symptoms of stress fractures include:  Pain with or after normal activity   Pain that goes away when resting and then returns when standing or during  activity    Pinpoint pain  (pain at the site of the fracture) when touched   Swelling, but no bruising   IMPROPER TREATMENT  Some people say that  the doctor can t do anything for a broken bone in the foot.  This is usually not true. In fact, if a fractured toe or metatarsal bone is not treated correctly, serious complications may develop. For example:  A deformity in the bony architecture which may limit the ability to move the foot or cause difficulty in fitting shoes   Arthritis, which may be caused by a fracture in a joint (the juncture where two bones meet), or may be a result of angular deformities that develop when a displaced fracture is severe or hasn t been properly corrected   Chronic pain and deformity   Non-union, or failure to heal, can lead to subsequent surgery or chronic pain.   PROPER TREATMENT FOR TOES  Fractures of the toe bones are almost always traumatic fractures. Treatment for traumatic fractures depends on the break itself and may include these options:  Rest. Sometimes rest is all that is needed to treat a traumatic fracture of the toe.   Splinting. The toe may be fitted with a splint to keep it in a fixed position.   Rigid or stiff-soled shoe. Wearing a stiff-soled shoe protects the toe and helps keep it properly positioned.    August taping  the fractured toe to another toe is sometimes appropriate, but in other cases it may be harmful.   Surgery. If the break is badly displaced or if the joint is affected, surgery may be necessary. Surgery often involves the use of fixation devices, such as pins.   PROPER TREATMENT OF METATARSALS  Breaks in the metatarsal bones may be either stress or traumatic fractures. Certain kinds of fractures of the metatarsal bones present unique challenges.  For example, sometimes a fracture of the first metatarsal bone (behind the big toe) can lead to arthritis. Since the big toe is used so frequently and bears more weight than other toes, arthritis in that area  can make it painful to walk, bend, or even stand.  Another type of break, called a Vasquez fracture, occurs at the base of the fifth metatarsal bone (behind the little toe). It is often misdiagnosed as an ankle sprain, and misdiagnosis can have serious consequences since sprains and fractures require different treatments. Your foot and ankle surgeon is an expert in correctly identifying these conditions as well as other problems of the foot.  Treatment of metatarsal fractures depends on the type and extent of the fracture, and may include:  Rest. Sometimes rest is the only treatment needed to promote healing of a stress or traumatic fracture of a metatarsal bone.   Avoid the offending activity. Because stress fractures result from repetitive stress, it is important to avoid the activity that led to the fracture. Crutches or a wheelchair are sometimes required to offload weight from the foot to give it time to heal.   Immobilization, casting, or rigid shoe. A stiff-soled shoe or other form of immobilization may be used to protect the fractured bone while it is healing.   Surgery. Some traumatic fractures of the metatarsal bones require surgery, especially if the break is badly displaced.   Follow-up care. Your foot and ankle surgeon will provide instructions for care following surgical or non-surgical treatment. Physical therapy, exercises and rehabilitation may be included in a schedule for return to normal activities.

## 2019-01-24 NOTE — LETTER
1/24/2019         RE: Hussain Warren  [address] [place] [id number]        Dear Colleague,    Thank you for referring your patient, Hussain Warren, to the [facility]. Please see a copy of my visit note below.    PATIENT HISTORY:  Hussain Warren is a 28 year old male who presents to clinic with a chief complaint of a painful right foot.  The patient relates the pain is located on the outside  aspect on the right foot.  The patient relates injuring the foot on Saturday while kicking ice off the car.  The patient was seen by the ER with x-rays revealing a nondisplaced fracture at the base of the fifth metatarsal on the right foot.  The patient was splinted and instructed on non weight bearing with crutches.    REVIEW OF SYSTEMS:  Constitutional, HEENT, cardiovascular, pulmonary, GI, , musculoskeletal, neuro, skin, endocrine and psych systems are negative, except as otherwise noted.     PAST MEDICAL HISTORY:   Past Medical History:   Diagnosis Date     Back pain      Depressive disorder      Other acne         PAST SURGICAL HISTORY:   Past Surgical History:   Procedure Laterality Date     ORTHOPEDIC SURGERY      boxer's fracture at age 15        MEDICATIONS:   Current Outpatient Medications:      HYDROcodone-acetaminophen (NORCO) 5-325 MG tablet, Take 1 tablet by mouth every 6 hours as needed for pain, Disp: 12 tablet, Rfl: 0     ALLERGIES:  No Known Allergies     SOCIAL HISTORY:   Social History     Socioeconomic History     Marital status: Single     Spouse name: Not on file     Number of children: Not on file     Years of education: Not on file     Highest education level: Not on file   Social Needs     Financial resource strain: Not on file     Food insecurity - worry: Not on file     Food insecurity - inability: Not on file     Transportation needs - medical: Not on file     Transportation needs - non-medical: Not on file   Occupational History     Not on file    Tobacco Use     Smoking status: Former Smoker     Packs/day: 0.40     Types: Cigarettes     Last attempt to quit: 2018     Years since quittin.4     Smokeless tobacco: Never Used   Substance and Sexual Activity     Alcohol use: Yes     Frequency: Monthly or less     Drinks per session: 1 or 2     Comment: Occ     Drug use: No     Sexual activity: Yes   Other Topics Concern      Service No     Blood Transfusions No     Caffeine Concern No     Comment: none during wrestling season     Occupational Exposure No     Hobby Hazards Yes     Comment: high school wrestler     Sleep Concern No     Stress Concern No     Weight Concern No     Special Diet Yes     Comment: wrestler     Back Care No     Exercise Not Asked     Bike Helmet Yes     Seat Belt Yes     Self-Exams Not Asked     Parent/sibling w/ CABG, MI or angioplasty before 65F 55M? No   Social History Narrative     Not on file        FAMILY HISTORY:   Family History   Problem Relation Age of Onset     Hypertension Father         gout     Diabetes Paternal Grandmother         type 2, Alzheimers     Hypertension Paternal Grandfather      Diabetes Paternal Uncle      Hypertension Paternal Uncle      Hypertension Paternal Aunt      Hypertension Paternal Aunt      Breast Cancer Paternal Aunt         EXAM:Vitals: There were no vitals taken for this visit.  BMI= There is no height or weight on file to calculate BMI.      General appearance: Patient is alert and fully cooperative with history & exam.  No sign of distress is noted during the visit.     Psychiatric: Affect is pleasant & appropriate.  Patient appears motivated to improve health.     Respiratory: Breathing is regular & unlabored while sitting.     HEENT: Hearing is intact to spoken word.  Speech is clear.  No gross evidence of visual impairment that would impact ambulation.     Dermatologic: Skin is intact to both lower extremities without significant lesions, rash or abrasion.  No paronychia  or evidence of soft tissue infection is noted.     Vascular: DP & PT pulses are intact & regular bilaterally.  No significant edema or varicosities noted.  CFT and skin temperature is normal to both lower extremities.     Neurologic: Lower extremity sensation is intact to light touch.  No evidence of weakness or contracture in the lower extremities.  No evidence of neuropathy.     Musculoskeletal: Patient is non-ambulatory with crutches.  No gross ankle deformity noted.  No foot or ankle joint effusion is noted.    One notes positive edema, positive ecchymosis.  One notes pain with palpation over the lateral aspect overlying the base of the fifth metatarsal  on the right.    Radiograph review of previous films including non weightbearing AP, lateral and medial oblique views of the right foot reveals an apparent closed nondisplaced fracture of the base of the fifth metatarsal.  All joint margins appear stable.  There is no apparent tumor formation noted.  There is no evidence of foreign body.    Assessment:  1.  Closed nondisplaced fracture of the base of the fifth metatarsal  of the right foot.    Plan:  I have explained to Hussain  about the conditions.  We discussed both conservative and surgical treatment options with all associated risks and benefits.  At this time, I do not believe there is need for surgery.  The patient will return to the office in one month for reevaluation and repeat x-rays.      Disclaimer: This note consists of symbols derived from keyboarding, dictation and/or voice recognition software. As a result, there may be errors in the script that have gone undetected. Please consider this when interpreting information found in this chart.       JEANE Capps D.P.M., F.A.C.F.A.S.      Again, thank you for allowing me to participate in the care of your patient.        Sincerely,        Miguel Capps DPM

## 2019-01-24 NOTE — NURSING NOTE
"Chief Complaint   Patient presents with     Consult     XR taken on 1/19/2019, Fracture of the 5th metatarsal       Initial /79 (BP Location: Left arm, Patient Position: Sitting, Cuff Size: Adult Large)   Pulse 93   Ht 1.803 m (5' 11\")   Wt 113.4 kg (250 lb)   BMI 34.87 kg/m   Estimated body mass index is 34.87 kg/m  as calculated from the following:    Height as of this encounter: 1.803 m (5' 11\").    Weight as of this encounter: 113.4 kg (250 lb).  Medications and allergies reviewed.      Neelam NORMAN MA      "

## 2019-02-18 ENCOUNTER — OFFICE VISIT (OUTPATIENT)
Dept: PODIATRY | Facility: CLINIC | Age: 29
End: 2019-02-18
Payer: MEDICAID

## 2019-02-18 ENCOUNTER — ALLIED HEALTH/NURSE VISIT (OUTPATIENT)
Dept: FAMILY MEDICINE | Facility: CLINIC | Age: 29
End: 2019-02-18
Payer: MEDICAID

## 2019-02-18 ENCOUNTER — ANCILLARY PROCEDURE (OUTPATIENT)
Dept: GENERAL RADIOLOGY | Facility: CLINIC | Age: 29
End: 2019-02-18
Attending: PODIATRIST
Payer: MEDICAID

## 2019-02-18 VITALS
HEIGHT: 71 IN | BODY MASS INDEX: 35 KG/M2 | WEIGHT: 250 LBS | HEART RATE: 90 BPM | DIASTOLIC BLOOD PRESSURE: 83 MMHG | SYSTOLIC BLOOD PRESSURE: 137 MMHG

## 2019-02-18 DIAGNOSIS — S92.354D CLOSED NONDISPLACED FRACTURE OF FIFTH METATARSAL BONE OF RIGHT FOOT WITH ROUTINE HEALING, SUBSEQUENT ENCOUNTER: Primary | ICD-10-CM

## 2019-02-18 DIAGNOSIS — Z11.1 SCREENING EXAMINATION FOR PULMONARY TUBERCULOSIS: Primary | ICD-10-CM

## 2019-02-18 DIAGNOSIS — S92.354D CLOSED NONDISPLACED FRACTURE OF FIFTH METATARSAL BONE OF RIGHT FOOT WITH ROUTINE HEALING, SUBSEQUENT ENCOUNTER: ICD-10-CM

## 2019-02-18 PROCEDURE — 99213 OFFICE O/P EST LOW 20 MIN: CPT | Performed by: PODIATRIST

## 2019-02-18 PROCEDURE — 99207 ZZC NO CHARGE NURSE ONLY: CPT

## 2019-02-18 PROCEDURE — 86580 TB INTRADERMAL TEST: CPT

## 2019-02-18 PROCEDURE — 73630 X-RAY EXAM OF FOOT: CPT | Mod: RT

## 2019-02-18 ASSESSMENT — MIFFLIN-ST. JEOR: SCORE: 2116.12

## 2019-02-18 NOTE — PROGRESS NOTES
Hussain returns to the office for reevaluation of the right foot.  The patient relates following the instructions given at the last visit with noted less pain.  The patient relates overall more  improvement in pain and function of the right foot.  The patient relates no other problems.    PAST MEDICAL HISTORY:   Past Medical History:   Diagnosis Date     Back pain      Depressive disorder      Other acne        BMI= Body mass index is 34.87 kg/m .    Weight management plan: Patient was referred to their PCP to discuss a diet and exercise plan.    Physical Exam:    General: The patient appears to have a pleasant mental affect.    Lower extremity physical exam:  Neurovascular status is intact with palpable pedal pulses and intact epicritic sensations.  Muscular exam is within normal limits to major muscle groups.  Integument is intact.      One notes decreased edema.  One notes decreased pain on palpation of the base of the fifth metatarsal on the right foot.    Radiograph evaluation including weightbearing AP, lateral and medial oblique views of the right foot reveals interval healing with increased trabeculation of the fifth metatarsal fracture    Assessment:      ICD-10-CM    1. Closed nondisplaced fracture of fifth metatarsal bone of right foot with routine healing, subsequent encounter S92.354D XR Foot Right G/E 3 Views       Plan:  I have explained to Hussain about the conditions.  At this time, the patient was instructed on icing, stretching, tissue massage and support.  The patient was fitted with a Dynaflex insert that will aid in offloading the tension forces to the soft tissues and prevent further inflammation.    The patient will return in four weeks for reevaluation if the symptoms do not resolve.      Disclaimer: This note consists of symbols derived from keyboarding, dictation and/or voice recognition software. As a result, there may be errors in the script that have gone undetected. Please consider this  when interpreting information found in this chart.       JEANE Capps D.P.M., [email or web address].

## 2019-02-18 NOTE — NURSING NOTE
The patient is asked the following questions today and these are his answers:    -Have you had a mantoux administered in the past 30 days?    No  -Have you had a previous positive Mantoux.  No  -Have you received BCG in the past.  No  -Have you had a live vaccine  (MMR, Varicella, OPV, Yellow Fever) in the last 6 weeks.  No  -Have you had and active  viral or bacterial infection in the past 6 weeks.  No  -Have you received corticosteroids or immunosuppressive agents in the past 6 weeks.  No  -Have you been diagnosed with HIV?  No  -Do you have a maglinancy?  No   Low BRO CMA

## 2019-02-18 NOTE — NURSING NOTE
"Chief Complaint   Patient presents with     RECHECK     XR taken on 1/19/2019, Fracture of the 5th metatarsal, XR taken today, pain has decreased       Initial /83 (BP Location: Left arm, Patient Position: Sitting, Cuff Size: Adult Large)   Pulse 90   Ht 1.803 m (5' 11\")   Wt 113.4 kg (250 lb)   BMI 34.87 kg/m   Estimated body mass index is 34.87 kg/m  as calculated from the following:    Height as of this encounter: 1.803 m (5' 11\").    Weight as of this encounter: 113.4 kg (250 lb).  Medications and allergies reviewed.      Neelam NORMAN MA    "

## 2019-02-18 NOTE — LETTER
February 18, 2019      Hussain Warren  [address] [place] [address]        To Whom It May Concern:    Hussain Warren was seen in our clinic. He may return to work with no restrictions      Sincerely,        Miguel Capps DPM

## 2019-02-18 NOTE — LETTER
2/18/2019         RE: Hussain Warren  [address] [facility] [address]        Dear Colleague,    Thank you for referring your patient, Hussain Warren, to the [facility]. Please see a copy of my visit note below.    Hussain returns to the office for reevaluation of the right foot.  The patient relates following the instructions given at the last visit with noted less pain.  The patient relates overall more  improvement in pain and function of the right foot.  The patient relates no other problems.    PAST MEDICAL HISTORY:   Past Medical History:   Diagnosis Date     Back pain      Depressive disorder      Other acne        BMI= Body mass index is 34.87 kg/m .    Weight management plan: Patient was referred to their PCP to discuss a diet and exercise plan.    Physical Exam:    General: The patient appears to have a pleasant mental affect.    Lower extremity physical exam:  Neurovascular status is intact with palpable pedal pulses and intact epicritic sensations.  Muscular exam is within normal limits to major muscle groups.  Integument is intact.      One notes decreased edema.  One notes decreased pain on palpation of the base of the fifth metatarsal on the right foot.    Radiograph evaluation including weightbearing AP, lateral and medial oblique views of the right foot reveals interval healing with increased trabeculation of the fifth metatarsal fracture    Assessment:      ICD-10-CM    1. Closed nondisplaced fracture of fifth metatarsal bone of right foot with routine healing, subsequent encounter S92.354D XR Foot Right G/E 3 Views       Plan:  I have explained to Hussain about the conditions.  At this time, the patient was instructed on icing, stretching, tissue massage and support.  The patient was fitted with a Dynaflex insert that will aid in offloading the tension forces to the soft tissues and prevent further inflammation.    The patient will return in four weeks for  reevaluation if the symptoms do not resolve.      Disclaimer: This note consists of symbols derived from keyboarding, dictation and/or voice recognition software. As a result, there may be errors in the script that have gone undetected. Please consider this when interpreting information found in this chart.       JEANE Capps D.P.M., F.SAMUELC.F.A.S.      Again, thank you for allowing me to participate in the care of your patient.        Sincerely,        Miguel Capps DPM

## 2019-02-20 ENCOUNTER — ALLIED HEALTH/NURSE VISIT (OUTPATIENT)
Dept: FAMILY MEDICINE | Facility: CLINIC | Age: 29
End: 2019-02-20
Payer: MEDICAID

## 2019-02-20 DIAGNOSIS — Z11.1 VISIT FOR MANTOUX TEST: Primary | ICD-10-CM

## 2019-02-20 PROCEDURE — 99207 ZZC NO CHARGE NURSE ONLY: CPT

## 2019-10-23 ENCOUNTER — OFFICE VISIT (OUTPATIENT)
Dept: FAMILY MEDICINE | Facility: CLINIC | Age: 29
End: 2019-10-23
Payer: COMMERCIAL

## 2019-10-23 VITALS
DIASTOLIC BLOOD PRESSURE: 74 MMHG | SYSTOLIC BLOOD PRESSURE: 138 MMHG | BODY MASS INDEX: 33.74 KG/M2 | OXYGEN SATURATION: 96 % | HEART RATE: 112 BPM | WEIGHT: 241 LBS | HEIGHT: 71 IN | RESPIRATION RATE: 20 BRPM | TEMPERATURE: 98 F

## 2019-10-23 DIAGNOSIS — B34.9 VIRAL ILLNESS: Primary | ICD-10-CM

## 2019-10-23 PROCEDURE — 99213 OFFICE O/P EST LOW 20 MIN: CPT | Performed by: NURSE PRACTITIONER

## 2019-10-23 ASSESSMENT — MIFFLIN-ST. JEOR: SCORE: 2075.3

## 2019-10-23 NOTE — PROGRESS NOTES
"    SUBJECTIVE   Hussain Warren is a  male who presents to clinic today for the following health issue(s):       RESPIRATORY SYMPTOMS      Duration: 3 days     Description  cough, fever, chills, myalgias, fatigue,nausea and diarrhea    Severity: Some improvement today     Accompanying signs and symptoms: None    History (predisposing factors):  none    Precipitating or alleviating factors: None    Therapies tried and outcome:  rest and fluids, Nyquil and Dayquil        ADDRESS ALL HEALTH MAINTENANCE NEEDS- Place orders as needed  Health Maintenance Due   Topic Date Due     PREVENTIVE CARE VISIT  1988     HIV SCREENING  12/23/2003     INFLUENZA VACCINE (1) 09/01/2019       PCP   Darnell Lockhart [phone]    PROBLEM LIST        Patient Active Problem List   Diagnosis     Other acne     Anxiety     Insomnia     Bipolar 1 disorder (H)     ADHD (attention deficit hyperactivity disorder)     Tobacco use     Elevated BP     CARDIOVASCULAR SCREENING; LDL GOAL LESS THAN 160     Corneal ulcer of left eye       MEDICATIONS        Current Outpatient Medications   Medication     order for DME     No current facility-administered medications for this visit.        Reviewed and updated as needed this visit by Provider:  Tobacco  Allergies  Meds  Med Hx  Surg Hx  Fam Hx  Soc Hx     ROS      Constitutional, HEENT, cardiovascular, pulmonary, gi and gu systems are negative, except as otherwise noted.    PHYSICAL EXAM   /74 (BP Location: Right arm, Patient Position: Sitting, Cuff Size: Adult Regular)   Pulse 112   Temp 98  F (36.7  C) (Tympanic)   Resp 20   Ht 1.803 m (5' 11\")   Wt 109.3 kg (241 lb)   SpO2 96%   BMI 33.61 kg/m    Body mass index is 33.61 kg/m .  GENERAL APPEARANCE: healthy, alert and no distress  EYES: Eyes grossly normal to inspection, PERRL and conjunctivae and sclerae normal  HENT: ear canals and TM's normal, nose and mouth without ulcers or lesions and nasal congestion  NECK: no " adenopathy, no asymmetry, masses, or scars and thyroid normal to palpation  RESP: lungs clear to auscultation - no rales, rhonchi or wheezes  CV: regular rates and rhythm, normal S1 S2, no S3 or S4 and no murmur, click or rub  ABDOMEN: soft, nontender, without hepatosplenomegaly or masses and bowel sounds normal  MS: extremities normal- no gross deformities noted  SKIN: no suspicious lesions or rashes  PSYCH: mentation appears normal and affect normal/bright    ASSESSMENT & PLAN     1. Viral illness  Acute, stable      Adult Self-Care for Colds  Colds are caused by viruses. They can t be cured with antibiotics. However, you can relieve symptoms and support your body s efforts to heal itself. No matter which symptoms you have, be sure to drink plenty of fluids (water or clear soup); stop smoking and drinking alcohol; and get plenty of rest.   Understand a Fever    Take your temperature several times a day. If your fever is 100.4 F for more than a day, call your doctor.    Relax, lie down. Go to bed if you want. Just get off your feet and rest. Also, drink plenty of fluids to avoid dehydration.    Take acetaminophen or a nonsteroidal anti-inflammatory agent (NSAID), such as ibuprofen.  Treat a Troubled Nose Kindly    Breathe steam or heated humidified air to open blocked nasal passages.  a hot shower or use a vaporizer. Be careful not to get burned by the steam.    Saline nasal sprays and decongestant tablets help open a stuffy nose. Antihistamines can also help, but they can cause side effects such as drowsiness and drying of the eyes, nose, and mouth.  Soothe a Sore Throat and Cough    Gargle every 2 hours with 1/4 teaspoon of salt dissolved in 1/2 cup of warm water. Suck on throat lozenges and cough drops to moisten your throat.    Cough medicines are available but it is unclear how effective they actually are.    Take acetaminophen or an NSAID, such as ibuprofen.  Ease Digestive Problems    Put fluid back  into your body. Take frequent sips of clear liquids such as water or broth. Do not drink beverages with a lot of sugar in them, such as juices and sodas. These can make diarrhea worse. Older children and adults can drink sports drinks.    As your appetite returns, you can resume your normal diet. Ask your doctor whether there are any foods you should avoid.     When to Call Your Doctor  When you first notice symptoms, ask your health care provider about antiviral medication. If taken soon after flu symptoms start, this can help you get well sooner. (Antibiotics should not be taken for colds or flu.) Also, call your doctor if you have any of the following symptoms or if you aren t feeling better after 7 days:    Shortness of breath    Pain or pressure in the chest or abdomen    Worsening symptoms, especially after a period of improvement    Fever of 100.4 F  (38.0 C) or higher, or fever that doesn t go down with medication    Sudden dizziness or confusion    Severe or continued vomiting    Signs of dehydration, including extreme thirst, dark urine, infrequent urination, dry mouth    Spotted, red, or very sore throat     6847-2098 [address] [address]. All rights reserved. This information is not intended as a substitute for professional medical care. Always follow your healthcare professional's instructions.          Risks, benefits, side effects and rationale for treatment plan fully discussed with the patient and understanding expressed.    Brandie Vines, [facility]

## 2019-10-23 NOTE — LETTER
October 23, 2019      Hussain Warren  [address] [place] [phone]        To Whom It May Concern:    Hussain Warren was seen in our clinic. He may return to work without restrictions.      Sincerely,        Brandie Vines, CNP

## 2019-10-23 NOTE — LETTER
October 23, 2019      Hussain Warren  [address] [facility] [address]        To Whom It May Concern:    Hussain Warren  was seen today.  Please excuse him from work yesterday October 22,2019 and today October 23,2019 due to illness.        Sincerely,        Brandie Vines, CNP

## 2019-10-23 NOTE — NURSING NOTE
"Chief Complaint   Patient presents with     Cough       Initial /74 (BP Location: Right arm, Patient Position: Sitting, Cuff Size: Adult Regular)   Pulse 112   Temp 98  F (36.7  C) (Tympanic)   Resp 20   Ht 1.803 m (5' 11\")   Wt 109.3 kg (241 lb)   SpO2 96%   BMI 33.61 kg/m   Estimated body mass index is 33.61 kg/m  as calculated from the following:    Height as of this encounter: 1.803 m (5' 11\").    Weight as of this encounter: 109.3 kg (241 lb).    Patient presents to the clinic using No DME    Health Maintenance that is potentially due pending provider review:  NONE    n/a    Is there anyone who you would like to be able to receive your results? No  If yes have patient fill out MURIEL    "

## 2019-10-23 NOTE — PATIENT INSTRUCTIONS
1. Viral illness  Acute, stable      Adult Self-Care for Colds  Colds are caused by viruses. They can t be cured with antibiotics. However, you can relieve symptoms and support your body s efforts to heal itself. No matter which symptoms you have, be sure to drink plenty of fluids (water or clear soup); stop smoking and drinking alcohol; and get plenty of rest.   Understand a Fever    Take your temperature several times a day. If your fever is 100.4 F for more than a day, call your doctor.    Relax, lie down. Go to bed if you want. Just get off your feet and rest. Also, drink plenty of fluids to avoid dehydration.    Take acetaminophen or a nonsteroidal anti-inflammatory agent (NSAID), such as ibuprofen.  Treat a Troubled Nose Kindly    Breathe steam or heated humidified air to open blocked nasal passages.  a hot shower or use a vaporizer. Be careful not to get burned by the steam.    Saline nasal sprays and decongestant tablets help open a stuffy nose. Antihistamines can also help, but they can cause side effects such as drowsiness and drying of the eyes, nose, and mouth.  Soothe a Sore Throat and Cough    Gargle every 2 hours with 1/4 teaspoon of salt dissolved in 1/2 cup of warm water. Suck on throat lozenges and cough drops to moisten your throat.    Cough medicines are available but it is unclear how effective they actually are.    Take acetaminophen or an NSAID, such as ibuprofen.  Ease Digestive Problems    Put fluid back into your body. Take frequent sips of clear liquids such as water or broth. Do not drink beverages with a lot of sugar in them, such as juices and sodas. These can make diarrhea worse. Older children and adults can drink sports drinks.    As your appetite returns, you can resume your normal diet. Ask your doctor whether there are any foods you should avoid.     When to Call Your Doctor  When you first notice symptoms, ask your health care provider about antiviral medication. If taken  soon after flu symptoms start, this can help you get well sooner. (Antibiotics should not be taken for colds or flu.) Also, call your doctor if you have any of the following symptoms or if you aren t feeling better after 7 days:    Shortness of breath    Pain or pressure in the chest or abdomen    Worsening symptoms, especially after a period of improvement    Fever of 100.4 F  (38.0 C) or higher, or fever that doesn t go down with medication    Sudden dizziness or confusion    Severe or continued vomiting    Signs of dehydration, including extreme thirst, dark urine, infrequent urination, dry mouth    Spotted, red, or very sore throat     [id number] [facility], [address] [place] [address], [place] [address]. All rights reserved. This information is not intended as a substitute for professional medical care. Always follow your healthcare professional's instructions.

## 2019-11-05 ENCOUNTER — OFFICE VISIT (OUTPATIENT)
Dept: FAMILY MEDICINE | Facility: CLINIC | Age: 29
End: 2019-11-05
Payer: COMMERCIAL

## 2019-11-05 VITALS
WEIGHT: 240 LBS | DIASTOLIC BLOOD PRESSURE: 74 MMHG | SYSTOLIC BLOOD PRESSURE: 128 MMHG | OXYGEN SATURATION: 97 % | RESPIRATION RATE: 18 BRPM | HEART RATE: 86 BPM | BODY MASS INDEX: 33.6 KG/M2 | TEMPERATURE: 97.6 F | HEIGHT: 71 IN

## 2019-11-05 DIAGNOSIS — R07.0 THROAT PAIN: ICD-10-CM

## 2019-11-05 DIAGNOSIS — J06.9 UPPER RESPIRATORY TRACT INFECTION, UNSPECIFIED TYPE: Primary | ICD-10-CM

## 2019-11-05 LAB
DEPRECATED S PYO AG THROAT QL EIA: NORMAL
SPECIMEN SOURCE: NORMAL

## 2019-11-05 PROCEDURE — 87880 STREP A ASSAY W/OPTIC: CPT | Performed by: FAMILY MEDICINE

## 2019-11-05 PROCEDURE — 87081 CULTURE SCREEN ONLY: CPT | Performed by: FAMILY MEDICINE

## 2019-11-05 PROCEDURE — 99213 OFFICE O/P EST LOW 20 MIN: CPT | Performed by: FAMILY MEDICINE

## 2019-11-05 ASSESSMENT — MIFFLIN-ST. JEOR: SCORE: 2070.76

## 2019-11-05 NOTE — LETTER
November 5, 2019      Hussain Hollinsming  [address] [place] [address]        To Whom It May Concern:        Hussain Warren was seen in our clinic.  Kindly excuse his work absence for today.          Sincerely,          Lorne Greenwood MD

## 2019-11-05 NOTE — NURSING NOTE
"Chief Complaint   Patient presents with     URI     /74 (Cuff Size: Adult Large)   Pulse 86   Temp 97.6  F (36.4  C) (Tympanic)   Resp 18   Ht 1.803 m (5' 11\")   Wt 108.9 kg (240 lb)   SpO2 97%   BMI 33.47 kg/m   Estimated body mass index is 33.47 kg/m  as calculated from the following:    Height as of this encounter: 1.803 m (5' 11\").    Weight as of this encounter: 108.9 kg (240 lb).  Patient presents to the clinic using No DME      Health Maintenance that is potentially due pending provider review:    Health Maintenance Due   Topic Date Due     PREVENTIVE CARE VISIT  1988     HIV SCREENING  12/23/2003     INFLUENZA VACCINE (1) 09/01/2019                "

## 2019-11-05 NOTE — LETTER
November 6, 2019      Hussain Warren  [address] [place] [address]        Dear Hussain,       The results of your 24 hour throat culture were negative. Please contact your clinic if you have any questions or concerns.      Sincerely,        Lorne Greenwood MD
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
## 2019-11-05 NOTE — PATIENT INSTRUCTIONS
Patient Education     When You Have a Sore Throat    A sore throat can be painful. There are many reasons why you may have a sore throat. Your healthcare provider will work with you to find the cause of your sore throat. He or she will also find the best treatment for you.  What causes a sore throat?  Sore throats can be caused or worsened by:    Cold or flu viruses    Bacteria    Irritants such as tobacco smoke or air pollution    Acid reflux  A healthy throat  The tonsils are on the sides of the throat near the base of the tongue. They collect viruses and bacteria and help fight infection. The throat (pharynx) is the passage for air. Mucus from the nasal cavity also moves down the passage.  An inflamed throat  The tonsils and pharynx can become inflamed due to a cold or flu virus. Postnasal drip (excess mucus draining from the nasal cavity) can irritate the throat. It can also make the throat or tonsils more likely to be infected by bacteria. Severe, untreated tonsillitis in children or adults can cause a pocket of pus (abscess) to form near the tonsil.  Your evaluation  A medical evaluation can help find the cause of your sore throat. It can also help your healthcare provider choose the best treatment for you. The evaluation may include a health history, physical exam, and diagnostic tests.  Health history  Your healthcare provider may ask you the following:    How long has the sore throat lasted and how have you been treating it?    Do you have any other symptoms, such as body aches, fever, or cough?    Does your sore throat recur? If so, how often? How many days of school or work have you missed because of a sore throat?    Do you have trouble eating or swallowing?    Have you been told that you snore or have other sleep problems?    Do you have bad breath?    Do you cough up bad-tasting mucus?  Physical exam  During the exam, your healthcare provider checks your ears, nose, and throat for problems. He or she  "also checks for swelling in the neck, and may listen to your chest.  Possible tests  Other tests your healthcare provider may perform include:    A throat swab to check for bacteria such as streptococcus (the bacteria that causes strep throat)    A blood test to check for mononucleosis (a viral infection)    A chest X-ray to rule out pneumonia, especially if you have a cough  Treating a sore throat  Treatment depends on many factors. What is the likely cause? Is the problem recent? Does it keep coming back? In many cases, the best thing to do is to treat the symptoms, rest, and let the problem heal itself. Antibiotics may help clear up some bacterial infections. For cases of severe or recurring tonsillitis, the tonsils may need to be removed.  Relieving your symptoms    Don t smoke, and avoid secondhand smoke.    For children, try throat sprays or Popsicles. Adults and older children may try lozenges.    Drink warm liquids to soothe the throat and help thin mucus. Avoid alcohol, spicy foods, and acidic drinks such as orange juice. These can irritate the throat.    Gargle with warm saltwater (1 teaspoon of salt to 8 ounces of warm water).    Use a humidifier to keep air moist and relieve throat dryness.    Try over-the-counter pain relievers such as acetaminophen or ibuprofen. Use as directed, and don t exceed the recommended dose. Don t give aspirin to children.   Are antibiotics needed?  If your sore throat is due to a bacterial infection, antibiotics may speed healing and prevent complications. Although group A streptococcus (\"strep throat\" or GAS) is the major treatable infection for a sore throat, GAS causes only 5% to 15% of sore throats in adults who seek medical care. Most sore throats are caused by cold or flu viruses. And antibiotics don t treat viral illness. In fact, using antibiotics when they re not needed may produce bacteria that are harder to kill. Your healthcare provider will prescribe antibiotics " only if he or she thinks they are likely to help.  If antibiotics are prescribed  Take the medicine exactly as directed. Be sure to finish your prescription even if you re feeling better. And be sure to ask your healthcare provider or pharmacist what side effects are common and what to do about them.  Is surgery needed?  In some cases, tonsils need to be removed. This is often done as outpatient (same-day) surgery. Your healthcare provider may advise removing the tonsils in cases of:    Several severe bouts of tonsillitis in a year.  Severe  episodes include those that lead to missed days of school or work, or that need to be treated with antibiotics.    Tonsillitis that causes breathing problems during sleep    Tonsillitis caused by food particles collecting in pouches in the tonsils (cryptic tonsillitis)  Call your healthcare provider if any of the following occur:    Symptoms worsen, or new symptoms develop.    Swollen tonsils make breathing difficult.    The pain is severe enough to keep you from drinking liquids.    A skin rash, hives, or wheezing develops. Any of these could signal an allergic reaction to antibiotics.    Symptoms don t improve within a week.    Symptoms don t improve within 2 to 3 days of starting antibiotics.   Date Last Reviewed: 10/1/2016    0667-6525 [employer]. [address] [place] [address], [place] [address]. All rights reserved. This information is not intended as a substitute for professional medical care. Always follow your healthcare professional's instructions.

## 2019-11-06 LAB
BACTERIA SPEC CULT: NORMAL
SPECIMEN SOURCE: NORMAL

## 2019-11-14 ENCOUNTER — IMMUNIZATION (OUTPATIENT)
Dept: FAMILY MEDICINE | Facility: CLINIC | Age: 29
End: 2019-11-14
Payer: COMMERCIAL

## 2019-11-14 PROCEDURE — 90471 IMMUNIZATION ADMIN: CPT

## 2019-11-14 PROCEDURE — 90686 IIV4 VACC NO PRSV 0.5 ML IM: CPT

## 2020-09-08 ENCOUNTER — VIRTUAL VISIT (OUTPATIENT)
Dept: FAMILY MEDICINE | Facility: OTHER | Age: 30
End: 2020-09-08

## 2020-09-08 DIAGNOSIS — Z20.822 SUSPECTED 2019 NOVEL CORONAVIRUS INFECTION: Primary | ICD-10-CM

## 2020-09-08 NOTE — PROGRESS NOTES
"Date: 2020 08:48:05  Clinician: Farshad Waterman  Clinician NPI: [id number]  Patient: barb sloan  Patient : 1990  [address] [place] [address] [place] [id number]  Patient Phone: [phone]  Visit Protocol: URI  Patient Summary:  barb is a 29 year old ( : 1990 ) male who initiated a Visit for COVID-19 (Coronavirus) evaluation and screening. When asked the question \"Please sign me up to receive news, health information and promotions. \", barb responded \"No\".    barb states his symptoms started 1-2 days ago.   His symptoms consist of ear pain, facial pain or pressure, rhinitis, a sore throat, a cough, nasal congestion, a headache, and malaise. He is experiencing mild difficulty breathing with activities but can speak normally in full sentences.   Symptom details     Nasal secretions: The color of his mucus is clear.    Cough: barb coughs every 5-10 minutes and his cough is more bothersome at night. Phlegm does not come into his throat when he coughs. He believes his cough is caused by post-nasal drip.     Sore throat: barb reports having mild throat pain (1-3 on a 10 point pain scale), does not have exudate on his tonsils, and can swallow liquids. He is not sure if the lymph nodes in his neck are enlarged. A rash has not appeared on the skin since the sore throat started.     Facial pain or pressure: The facial pain or pressure feels worse when bending over or leaning forward.     Headache: He states the headache is mild (1-3 on a 10 point pain scale).      barb denies having anosmia, fever, vomiting, nausea, wheezing, teeth pain, ageusia, diarrhea, chills, and myalgias. He also denies taking antibiotic medication in the past month and having recent facial or sinus surgery in the past 60 days.   Precipitating events  Within the past week, barb has not been exposed to someone with strep throat. He has not recently been exposed to someone with influenza. barb has been in close " contact with the following high risk individuals: children under the age of 5.   Pertinent COVID-19 (Coronavirus) information  In the past 14 days, barb has not worked in a congregate living setting.   He does not work or volunteer as healthcare worker or a  and does not work or volunteer in a healthcare facility.   barb also has not lived in a congregate living setting in the past 14 days. He lives with a healthcare worker.   barb has not had a close contact with a laboratory-confirmed COVID-19 patient within 14 days of symptom onset.   Since December 2019, barb and has had upper respiratory infection (URI) or influenza-like illness. Has not been diagnosed with lab-confirmed COVID-19 test      Date(s) of previous URI or influenza-like illness (free-text): NA     Symptoms barb experienced during previous URI or influenza-like illness as reported by the patient (free-text): Fever, chills, diahrea, cough, muscle aches, tiredness (diagnosed influenza B)        Pertinent medical history  barb needs a return to work/school note.   Weight: 260 lbs   barb smokes or uses smokeless tobacco.   Weight: 260 lbs    MEDICATIONS: No current medications, ALLERGIES: NKDA  Clinician Response:  Dear barb,   Your symptoms show that you may have coronavirus (COVID-19). This illness can cause fever, cough and trouble breathing. Many people get a mild case and get better on their own. Some people can get very sick.  What should I do?  We would like to test you for this virus.   1. Please call [phone] to schedule your visit. Explain that you [facility] to have a COVID-19 test. Be ready to share your [facility] visit ID number.  The following will serve as your written order for this COVID Test, ordered by me, for the indication of suspected COVID [Z20.828]: The test will be ordered in [employer], our electronic health record, after you are scheduled. It will show as ordered and authorized by Kirt Ponce MD.   "Order: COVID-19 (Coronavirus) PCR for SYMPTOMATIC testing from [facility].      2. When it's time for your COVID test:  Stay at least 6 feet away from others. (If someone will drive you to your test, stay in the backseat, as far away from the  as you can.)   Cover your mouth and nose with a mask, tissue or washcloth.  Go straight to the testing site. Don't make any stops on the way there or back.      3.Starting now: Stay home and away from others (self-isolate) until:   You've had no fever---and no medicine that reduces fever---for one full day (24 hours). And...   Your other symptoms have gotten better. For example, your cough or breathing has improved. And...   At least 10 days have passed since your symptoms started.       During this time, don't leave the house except for testing or medical care.   Stay in your own room, even for meals. Use your own bathroom if you can.   Stay away from others in your home. No hugging, kissing or shaking hands. No visitors.  Don't go to work, school or anywhere else.    Clean \"high touch\" surfaces often (doorknobs, counters, handles, etc.). Use a household cleaning spray or wipes. You'll find a full list of  on the EPA website: www.epa.gov/pesticide-registration/list-n-disinfectants-use-against-sars-cov-2.   Cover your mouth and nose with a mask, tissue or washcloth to avoid spreading germs.  Wash your hands and face often. Use soap and water.  Caregivers in these groups are at risk for severe illness due to COVID-19:  o People 65 years and older  o People who live in a nursing home or long-term care facility  o People with chronic disease (lung, heart, cancer, diabetes, kidney, liver, immunologic)  o People who have a weakened immune system, including those who:   Are in cancer treatment  Take medicine that weakens the immune system, such as corticosteroids  Had a bone marrow or organ transplant  Have an immune deficiency  Have poorly controlled HIV or AIDS  Are " obese (body mass index of 40 or higher)  Smoke regularly   o Caregivers should wear gloves while washing dishes, handling laundry and cleaning bedrooms and bathrooms.  o Use caution when washing and drying laundry: Don't shake dirty laundry, and use the warmest water setting that you can.  o For more tips, go to www.cdc.gov/coronavirus/2019-ncov/downloads/10Things.pdf.    4.Sign up for [employer]. We know it's scary to hear that you might have COVID-19. We want to track your symptoms to make sure you're okay over the next 2 weeks. Please look for an email from [employer]---this is a free, online program that we'll use to keep in touch. To sign up, follow the link in the email. Learn more at [email or web address]  How can I take care of myself?   Get lots of rest. Drink extra fluids (unless a doctor has told you not to).   Take Tylenol (acetaminophen) for fever or pain. If you have liver or kidney problems, ask your family doctor if it's okay to take Tylenol.   Adults can take either:    650 mg (two 325 mg pills) every 4 to 6 hours, or...   1,000 mg (two 500 mg pills) every 8 hours as needed.    Note: Don't take more than 3,000 mg in one day. Acetaminophen is found in many medicines (both prescribed and over-the-counter medicines). Read all labels to be sure you don't take too much.   For children, check the Tylenol bottle for the right dose. The dose is based on the child's age or weight.    If you have other health problems (like cancer, heart failure, an organ transplant or severe kidney disease): Call your specialty clinic if you don't feel better in the next 2 days.       Know when to call 911. Emergency warning signs include:    Trouble breathing or shortness of breath Pain or pressure in the chest that doesn't go away Feeling confused like you haven't felt before, or not being able to wake up Bluish-colored lips or face.  Where can I get more information?   [facility] -- About COVID-19:  [email or web address]   CDC -- What to Do If You're Sick: www.cdc.gov/coronavirus/2019-ncov/about/steps-when-sick.html   CDC -- Ending Home Isolation: www.cdc.gov/coronavirus/2019-ncov/hcp/disposition-in-home-patients.html   CDC -- Caring for Someone: www.cdc.gov/coronavirus/2019-ncov/if-you-are-sick/care-for-someone.html   [facility] -- Interim Guidance for Hospital Discharge to Home: www.[email or web address]./diseases/coronavirus/hcp/hospdischarge.pdf   [facility] clinical trials (COVID-19 research studies): clinicalaffairs.[employer].[facility]/[employer]-clinical-trials    Below are the COVID-19 hotlines at the [place] Department of [facility] ([facility]). Interpreters are available.    For health questions: Call 942-719-8268 or 1-117.566.7716 (7 a.m. to 7 p.m.) For questions about schools and childcare: Call 855-419-3618 or 1-327.208.4126 (7 a.m. to 7 p.m.)    Diagnosis: Nasal congestion  Diagnosis ICD: R09.81

## 2020-09-09 DIAGNOSIS — Z20.822 SUSPECTED 2019 NOVEL CORONAVIRUS INFECTION: ICD-10-CM

## 2020-09-09 PROCEDURE — U0003 INFECTIOUS AGENT DETECTION BY NUCLEIC ACID (DNA OR RNA); SEVERE ACUTE RESPIRATORY SYNDROME CORONAVIRUS 2 (SARS-COV-2) (CORONAVIRUS DISEASE [COVID-19]), AMPLIFIED PROBE TECHNIQUE, MAKING USE OF HIGH THROUGHPUT TECHNOLOGIES AS DESCRIBED BY CMS-2020-01-R: HCPCS | Performed by: FAMILY MEDICINE

## 2020-09-11 LAB
SARS-COV-2 RNA SPEC QL NAA+PROBE: NOT DETECTED
SPECIMEN SOURCE: NORMAL

## 2020-12-15 ENCOUNTER — VIRTUAL VISIT (OUTPATIENT)
Dept: FAMILY MEDICINE | Facility: CLINIC | Age: 30
End: 2020-12-15
Payer: COMMERCIAL

## 2020-12-15 DIAGNOSIS — Z20.822 EXPOSURE TO COVID-19 VIRUS: Primary | ICD-10-CM

## 2020-12-15 DIAGNOSIS — J30.2 SEASONAL ALLERGIC RHINITIS, UNSPECIFIED TRIGGER: ICD-10-CM

## 2020-12-15 DIAGNOSIS — R05.9 COUGH: ICD-10-CM

## 2020-12-15 PROCEDURE — 99213 OFFICE O/P EST LOW 20 MIN: CPT | Mod: 95 | Performed by: NURSE PRACTITIONER

## 2020-12-15 RX ORDER — BENZONATATE 100 MG/1
100-200 CAPSULE ORAL 3 TIMES DAILY PRN
Qty: 42 CAPSULE | Refills: 0 | Status: SHIPPED | OUTPATIENT
Start: 2020-12-15 | End: 2021-02-09

## 2020-12-15 RX ORDER — FLUTICASONE PROPIONATE 50 MCG
2 SPRAY, SUSPENSION (ML) NASAL DAILY
Qty: 16 G | Refills: 1 | Status: SHIPPED | OUTPATIENT
Start: 2020-12-15 | End: 2021-02-09

## 2020-12-15 NOTE — NURSING NOTE
"Chief Complaint   Patient presents with     Cough       Initial There were no vitals taken for this visit. Estimated body mass index is 33.47 kg/m  as calculated from the following:    Height as of 11/5/19: 1.803 m (5' 11\").    Weight as of 11/5/19: 108.9 kg (240 lb).    Patient presents to the clinic using     Health Maintenance that is potentially due pending provider review:          Is there anyone who you would like to be able to receive your results?   If yes have patient fill out MURIEL    "

## 2020-12-15 NOTE — PATIENT INSTRUCTIONS
1. Exposure to COVID-19 virus  Known exposure to significant other who had COVID on 12/2/2020. Patient declines testing as sure has the virus and is in quarantine until 12/28/2020. Discussed supportive cares and quarantine guidelines.     2. Cough  Cough, chronic. Has not had workup for. Has been slightly worse in the last 5 days. No fevers. Will start tessalon three times daily as needed for cough. Also recommend having follow up in clinic for further evaluation of chronic cough. Patient is also a smoker - would encourage smoking cessation.   - benzonatate (TESSALON) 100 MG capsule; Take 1-2 capsules (100-200 mg) by mouth 3 times daily as needed for cough  Dispense: 42 capsule; Refill: 0    3. Seasonal allergic rhinitis, unspecified trigger  Chronic, start flonase nasal spray daily.   - fluticasone (FLONASE) 50 MCG/ACT nasal spray; Spray 2 sprays into both nostrils daily  Dispense: 16 g; Refill: 1  - benzonatate (TESSALON) 100 MG capsule; Take 1-2 capsules (100-200 mg) by mouth 3 times daily as needed for cough  Dispense: 42 capsule; Refill: 0

## 2020-12-15 NOTE — PROGRESS NOTES
"Hussain Warren is a 29 year old male who is being evaluated via a billable video visit.      The patient has been notified of following:     \"This video visit will be conducted via a call between you and your physician/provider. We have found that certain health care needs can be provided without the need for an in-person physical exam.  This service lets us provide the care you need with a video conversation.  If a prescription is necessary we can send it directly to your pharmacy.  If lab work is needed we can place an order for that and you can then stop by our lab to have the test done at a later time.    Video visits are billed at different rates depending on your insurance coverage.  Please reach out to your insurance provider with any questions.    If during the course of the call the physician/provider feels a video visit is not appropriate, you will not be charged for this service.\"    Patient has given verbal consent for Video visit? Yes  How would you like to obtain your AVS? Mail a copy  If you are dropped from the video visit, the video invite should be resent to: Text to cell phone: [phone]  Will anyone else be joining your video visit? No      Subjective     Hussain Warren is a 29 year old male who presents today via video visit for the following health issues:    HPI       Concern for COVID-19  About how many days ago did these symptoms start? Cough for year but worse in last 5 days  Is this your first visit for this illness? Yes  In the 14 days before your symptoms started, have you had close contact with someone with COVID-19 (Coronavirus)? Yes, I have been in contact with someone who has COVID-19/Coronavirus (confirmed by lab test). Significant other 12/02/20  Do you have a fever or chills? No  Are you having new or worsening difficulty breathing? Yes   Please describe what kind of difficulty you are having breathing:No dyspnea, or dyspnea at patients normal baseline  Do you have new or " worsening cough? Can be both dry and mucus - mostly dry   Have you had any new or unexplained body aches? YES  With cough  Have you experienced any of the following NEW symptoms?    Headache: YES - tweaked neck from coughing    Sore throat: YES    Loss of taste or smell: No    Chest pain: YES  With the cough    Diarrhea: No    Rash: No  What treatments have you tried? dayquil and nyquil cough drops  Who do you live with? Girlfriend, son  And her daughter  Are you, or a household member, a healthcare worker or a ? YES  Do you live in a nursing home, group home, or shelter? No  Do you have a way to get food/medications if quarantined? NO          Feels he knows he has COVID - doesn't want testing. Is quarantining until the 28th   Has long history of cough and allergies - has not had any workup     Video Start Time: 4:40 PM      Review of Systems   Constitutional, HEENT, cardiovascular, pulmonary, gi and gu systems are negative, except as otherwise noted.      Objective           Vitals:  No vitals were obtained today due to virtual visit.    Physical Exam     GENERAL: Healthy, alert and no distress  EYES: Eyes grossly normal to inspection.  No discharge or erythema, or obvious scleral/conjunctival abnormalities.  RESP: No audible wheeze, cough, or visible cyanosis.  No visible retractions or increased work of breathing.    SKIN: Visible skin clear. No significant rash, abnormal pigmentation or lesions.  NEURO: Cranial nerves grossly intact.  Mentation and speech appropriate for age.  PSYCH: Mentation appears normal, affect normal/bright, judgement and insight intact, normal speech and appearance well-groomed.      Diagnostic Test Results:  none          Assessment & Plan     1. Exposure to COVID-19 virus  Known exposure to significant other who had COVID on 12/2/2020. Patient declines testing as sure has the virus and is in quarantine until 12/28/2020. Discussed supportive cares and quarantine  guidelines.     2. Cough  Cough, chronic. Has not had workup for. Has been slightly worse in the last 5 days. No fevers. Will start tessalon three times daily as needed for cough. Also recommend having follow up in clinic for further evaluation of chronic cough. Patient is also a smoker - would encourage smoking cessation.   - benzonatate (TESSALON) 100 MG capsule; Take 1-2 capsules (100-200 mg) by mouth 3 times daily as needed for cough  Dispense: 42 capsule; Refill: 0    3. Seasonal allergic rhinitis, unspecified trigger  Chronic, start flonase nasal spray daily.   - fluticasone (FLONASE) 50 MCG/ACT nasal spray; Spray 2 sprays into both nostrils daily  Dispense: 16 g; Refill: 1  - benzonatate (TESSALON) 100 MG capsule; Take 1-2 capsules (100-200 mg) by mouth 3 times daily as needed for cough  Dispense: 42 capsule; Refill: 0         See Patient Instructions    Return in about 1 week (around 12/22/2020), or if symptoms worsen or fail to improve.    ANAYA Macedo CNP  [facility]      Video-Visit Details    Type of service:  Video Visit    Video End Time:4:48 PM    Originating Location (pt. Location): Home    Distant Location (provider location):  [facility]     Platform used for Video Visit: Regla

## 2021-02-09 ENCOUNTER — OFFICE VISIT (OUTPATIENT)
Dept: FAMILY MEDICINE | Facility: CLINIC | Age: 31
End: 2021-02-09
Payer: COMMERCIAL

## 2021-02-09 VITALS
HEART RATE: 81 BPM | RESPIRATION RATE: 18 BRPM | OXYGEN SATURATION: 99 % | TEMPERATURE: 97.5 F | DIASTOLIC BLOOD PRESSURE: 64 MMHG | SYSTOLIC BLOOD PRESSURE: 120 MMHG

## 2021-02-09 DIAGNOSIS — J02.0 STREPTOCOCCAL SORE THROAT: ICD-10-CM

## 2021-02-09 DIAGNOSIS — J20.9 ACUTE BRONCHITIS WITH SYMPTOMS > 10 DAYS: Primary | ICD-10-CM

## 2021-02-09 DIAGNOSIS — Z20.822 ENCOUNTER FOR LABORATORY TESTING FOR COVID-19 VIRUS: ICD-10-CM

## 2021-02-09 LAB
DEPRECATED S PYO AG THROAT QL EIA: NEGATIVE
SPECIMEN SOURCE: NORMAL
SPECIMEN SOURCE: NORMAL
STREP GROUP A PCR: NOT DETECTED

## 2021-02-09 PROCEDURE — 87651 STREP A DNA AMP PROBE: CPT | Performed by: NURSE PRACTITIONER

## 2021-02-09 PROCEDURE — 99N1174 PR STATISTIC STREP A RAPID: Performed by: NURSE PRACTITIONER

## 2021-02-09 PROCEDURE — 99214 OFFICE O/P EST MOD 30 MIN: CPT | Performed by: NURSE PRACTITIONER

## 2021-02-09 RX ORDER — PREDNISONE 20 MG/1
TABLET ORAL
Qty: 10 TABLET | Refills: 0 | Status: SHIPPED | OUTPATIENT
Start: 2021-02-09 | End: 2021-02-19

## 2021-02-09 RX ORDER — AZITHROMYCIN 250 MG/1
TABLET, FILM COATED ORAL
Qty: 6 TABLET | Refills: 0 | Status: SHIPPED | OUTPATIENT
Start: 2021-02-09 | End: 2021-02-14

## 2021-02-09 RX ORDER — ALBUTEROL SULFATE 90 UG/1
2 AEROSOL, METERED RESPIRATORY (INHALATION) EVERY 6 HOURS PRN
Qty: 6.7 G | Refills: 0 | Status: SHIPPED | OUTPATIENT
Start: 2021-02-09 | End: 2021-08-30

## 2021-02-09 NOTE — PROGRESS NOTES
Assessment & Plan     Acute bronchitis with symptoms > 10 days  Symptoms also representative of acute bronchitis we will treat with a course of prednisone and albuterol inhaler if not improving patient will follow up on to further work-up for underlying COPD versus asthma due to smoking history  - predniSONE (DELTASONE) 20 MG tablet; Take one tablet twice a day for 5 days.  - albuterol (PROAIR HFA/PROVENTIL HFA/VENTOLIN HFA) 108 (90 Base) MCG/ACT inhaler; Inhale 2 puffs into the lungs every 6 hours as needed for shortness of breath / dyspnea or wheezing  - azithromycin (ZITHROMAX) 250 MG tablet; Take 2 tablets (500 mg) by mouth daily for 1 day, THEN 1 tablet (250 mg) daily for 4 days.  - Group A Streptococcus PCR Throat Swab    Streptococcal sore throat  - Streptococcus A Rapid Scr w Reflx to PCR    Encounter for laboratory testing for COVID-19 virus  Patient declined current Covid test as patient states he feels he has had Covid in December patient would like to have antibody test will check with insurance for cost before obtaining did order today he can make a lab only appointment to have that scheduled  - COVID-19 Virus (Coronavirus) Antibody & Titer Reflex; Future           See Patient Instructions    No follow-ups on file.    ANAYA Bojorquez CNP  [facility]    Abbi Nixon is a 30 year old who presents to clinic today for the following health issues     HPI       Acute Illness  Acute illness concerns:   Onset/Duration: a couple years  Symptoms:  Fever: no  Chills/Sweats: no  Headache (location?): no  Sinus Pressure: no  Conjunctivitis:  YES  Ear Pain: no  Rhinorrhea: YES  Congestion: YES  Sore Throat: YES  Cough: YES-worsening over time  Wheeze: YES  Decreased Appetite: no  Nausea: no  Vomiting: no  Diarrhea: no  Fatigue/Achiness: no  Sick/Strep Exposure: no  Therapies tried and outcome: robitussin, cough drops, nyquil, dayquil       Review of Systems   Constitutional,  HEENT, cardiovascular, pulmonary, gi and gu systems are negative, except as otherwise noted.      Objective    /64   Pulse 81   Temp 97.5  F (36.4  C)   Resp 18   SpO2 99%   There is no height or weight on file to calculate BMI.  Physical Exam   GENERAL: healthy, alert and no distress  EYES: Eyes grossly normal to inspection, PERRL and conjunctivae and sclerae normal  HENT: ear canals and TM's normal, nose and mouth without ulcers or lesions  NECK: no adenopathy, no asymmetry, masses, or scars and thyroid normal to palpation  RESP: lungs clear to auscultation - no rales, rhonchi or wheezes  CV: regular rate and rhythm, normal S1 S2, no S3 or S4, no murmur, click or rub, no peripheral edema and peripheral pulses strong  ABDOMEN: soft, nontender, no hepatosplenomegaly, no masses and bowel sounds normal  MS: no gross musculoskeletal defects noted, no edema  SKIN: no suspicious lesions or rashes  NEURO: Normal strength and tone, mentation intact and speech normal  PSYCH: mentation appears normal, affect normal/bright    Results for orders placed or performed in visit on 02/09/21   Streptococcus A Rapid Scr w Reflx to PCR     Status: None    Specimen: Throat   Result Value Ref Range    Strep Specimen Description Throat     Streptococcus Group A Rapid Screen Negative NEG^Negative

## 2021-02-09 NOTE — PATIENT INSTRUCTIONS
Patient Education     Pharyngitis: Strep (Presumed)    You have pharyngitis (sore throat). The healthcare staff may think your sore throat is caused by a bacteria called Group A streptococcus (strep). This is often called strep throat. This is diagnosed by either a rapid strep test, which gives immediate results, or a throat culture, or both. Strep throat can cause throat pain that is worse when swallowing, aching all over, headache, swollen lymph nodes or glands in the neck, and fever. The infection is contagious. It often spreads by coughing, kissing, or touching others after touching your mouth or nose. An antibiotic is given to treat the infection. Antibiotics are prescribed by doctors to treat bacterial infections, not viral infections.   Home care    Rest at home. Drink plenty of fluids so you won t get dehydrated.    Stay home from work or school for the first 24 hours of taking the antibiotics, or as directed by the healthcare provider. After this time, you won't be contagious. You can then return to work or school if you are feeling better, or as directed by the provider.     Take the antibiotic medicine for the full 10 days, or as directed by the provider, even when you feel better or your symptoms improve. This is very important to make sure the infection is fully treated. It's also important to prevent medicine-resistant germs from growing. It's also the best way to prevent rheumatic fever, which can affect your heart and other parts of the body. If you were given an antibiotic shot, your provider will tell you if more antibiotics are needed.    You may use acetaminophen or ibuprofen to control pain or fever, unless another medicine was prescribed for this. If you have chronic liver or kidney disease or ever had a stomach ulcer or gastrointestinal bleeding, talk with your provider before using these medicines.    Use throat lozenges or a throat-numbing spray to help reduce throat pain. Gargling with  warm salt water can also help reduce throat pain. Dissolve 1/2 teaspoon of salt in 1 glass of warm water.     Don t eat salty or spicy foods. These can irritate the throat.    Follow-up care  Follow up with your healthcare provider or our staff if you don't feel better in 72 hours, or as directed.   When to get medical advice  Call your healthcare provider right away if any of these occur:     Fever of 100.4 F (38 C), or higher, or as directed by your provider    New or worse ear pain, sinus pain, or headache    Painful lumps in the back of neck    Stiff neck    Lymph nodes that get larger or neck swelling    Can t open mouth wide due to throat pain    Signs of dehydration, such as very dark urine or no urine, sunken eyes, dizziness    Noisy breathing    Muffled voice    New rash    Symptoms are worse    New symptoms develop  Call 911  Call 911 if you have any of these symptoms:     Can't swallow, especially saliva, with a lot of drooling    Trouble breathing or wheezing    Feeling faint    Can't talk    Feeling of doom  Prevention  Here are steps you can take to help prevent an infection:    Keep good handwashing habits.    Don t have close contact with people who have sore throats, colds, or other upper respiratory infections.    Don t smoke, and stay away from secondhand smoke.    Stay up to date with all of your vaccines.  [employer] last reviewed this educational content on 2/1/2020 2000-2020 [employer]. [address] [place] [id number]. All rights reserved. This information is not intended as a substitute for professional medical care. Always follow your healthcare professional's instructions.           Patient Education     Bronchitis, Antibiotic Treatment (Adult)    Bronchitis is an infection of the air passages (bronchial tubes) in your lungs. It often occurs when you have a cold. This illness is contagious during the first few days and is spread through the air by coughing and  sneezing, or by direct contact (touching the sick person and then touching your own eyes, nose, or mouth).  Symptoms of bronchitis include cough with mucus (phlegm) and low-grade fever. Bronchitis usually lasts 7 to 14 days. Mild cases can be treated with simple home remedies. More severe infection is treated with an antibiotic.  Home care  Follow these guidelines when caring for yourself at home:    If your symptoms are severe, rest at home for the first 2 to 3 days. When you go back to your usual activities, don't let yourself get too tired.    Don't smoke. Also stay away from secondhand smoke.    You may use over-the-counter medicines to control fever or pain, unless another medicine was prescribed. If you have chronic liver or kidney disease or have ever had a stomach ulcer or gastrointestinal bleeding, talk with your healthcare provider before using these medicines. Also talk to your provider if you are taking medicine to prevent blood clots. Aspirin should never be given to anyone younger than 18 who is ill with a viral infection or fever. It may cause severe liver or brain damage.    Your appetite may be low, so a light diet is fine. Stay well hydrated by drinking 6 to 8 glasses of fluids per day. This includes water, soft drinks, sports drinks, juices, tea, or soup. Extra fluids will help loosen mucus in your nose and lungs.    Over-the-counter cough, cold, and sore-throat medicines will not shorten the length of the illness, but they may be helpful to reduce your symptoms. Don't use decongestants if you have high blood pressure.    Finish all antibiotic medicine. Do this even if you are feeling better after only a few days.  Follow-up care  Follow up with your healthcare provider, or as advised. If you had an X-ray or ECG (electrocardiogram), a specialist will review it. You will be told of any new test results that may affect your care.  If you are age 65 or older, if you smoke, or if you have a chronic  lung disease or condition that affects your immune system, ask your healthcare provider about getting a pneumococcal vaccine and a yearly flu shot (influenza vaccine).  When to seek medical advice  Call your healthcare provider right away if any of these occur:    Fever of 100.4 F (38 C) or higher, or as directed by your healthcare provider    Coughing up more sputum    Weakness, drowsiness, headache, facial pain, ear pain, or a stiff neck  Call 911  Call 911 if any of these occur.    Coughing up blood    Weakness, drowsiness, headache, or stiff neck that get worse    Trouble breathing, wheezing, or pain with breathing  StayWell last reviewed this educational content on 6/1/2018 2000-2020 [employer]. [address] [place] [address], [place] [address]. All rights reserved. This information is not intended as a substitute for professional medical care. Always follow your healthcare professional's instructions.

## 2021-02-09 NOTE — RESULT ENCOUNTER NOTE
Please Notify Hussain  of test results strep was negative.  Continue with treatment plan as ness Jamil CNP

## 2021-02-09 NOTE — LETTER
[facility]  [address] [place] [id number]  Phone: [phone]  Fax: [phone]    February 9, 2021        Hussain Warren  [address] [place] [id number]          To whom it may concern:    RE: Hussain Warren    Patient was seen and treated today at our clinic and missed work.    Can return to work  once fever free and on medications for 24 hours.    Please contact me for questions or concerns.      Sincerely,        ANAYA Bojorquez CNP

## 2021-02-11 ENCOUNTER — OFFICE VISIT (OUTPATIENT)
Dept: FAMILY MEDICINE | Facility: CLINIC | Age: 31
End: 2021-02-11
Payer: COMMERCIAL

## 2021-02-11 ENCOUNTER — ANCILLARY PROCEDURE (OUTPATIENT)
Dept: GENERAL RADIOLOGY | Facility: CLINIC | Age: 31
End: 2021-02-11
Attending: NURSE PRACTITIONER
Payer: COMMERCIAL

## 2021-02-11 ENCOUNTER — TELEPHONE (OUTPATIENT)
Dept: FAMILY MEDICINE | Facility: CLINIC | Age: 31
End: 2021-02-11

## 2021-02-11 VITALS
OXYGEN SATURATION: 97 % | RESPIRATION RATE: 14 BRPM | WEIGHT: 250 LBS | BODY MASS INDEX: 35 KG/M2 | HEART RATE: 110 BPM | HEIGHT: 71 IN | TEMPERATURE: 97.4 F

## 2021-02-11 DIAGNOSIS — R05.9 COUGH: Primary | ICD-10-CM

## 2021-02-11 DIAGNOSIS — R05.9 COUGH: ICD-10-CM

## 2021-02-11 LAB
ALBUMIN SERPL-MCNC: 4.4 G/DL (ref 3.4–5)
ALP SERPL-CCNC: 128 U/L (ref 40–150)
ALT SERPL W P-5'-P-CCNC: 80 U/L (ref 0–70)
ANION GAP SERPL CALCULATED.3IONS-SCNC: 6 MMOL/L (ref 3–14)
AST SERPL W P-5'-P-CCNC: 37 U/L (ref 0–45)
BASOPHILS # BLD AUTO: 0 10E9/L (ref 0–0.2)
BASOPHILS NFR BLD AUTO: 0.1 %
BILIRUB SERPL-MCNC: 0.3 MG/DL (ref 0.2–1.3)
BUN SERPL-MCNC: 15 MG/DL (ref 7–30)
CALCIUM SERPL-MCNC: 9.7 MG/DL (ref 8.5–10.1)
CHLORIDE SERPL-SCNC: 106 MMOL/L (ref 94–109)
CO2 SERPL-SCNC: 26 MMOL/L (ref 20–32)
CREAT SERPL-MCNC: 1 MG/DL (ref 0.66–1.25)
D DIMER PPP FEU-MCNC: 0.3 UG/ML FEU (ref 0–0.5)
DIFFERENTIAL METHOD BLD: ABNORMAL
EOSINOPHIL # BLD AUTO: 0 10E9/L (ref 0–0.7)
EOSINOPHIL NFR BLD AUTO: 0.3 %
ERYTHROCYTE [DISTWIDTH] IN BLOOD BY AUTOMATED COUNT: 14.1 % (ref 10–15)
GFR SERPL CREATININE-BSD FRML MDRD: >90 ML/MIN/{1.73_M2}
GLUCOSE SERPL-MCNC: 108 MG/DL (ref 70–99)
HCT VFR BLD AUTO: 44.3 % (ref 40–53)
HGB BLD-MCNC: 14.7 G/DL (ref 13.3–17.7)
LYMPHOCYTES # BLD AUTO: 2.3 10E9/L (ref 0.8–5.3)
LYMPHOCYTES NFR BLD AUTO: 16.5 %
MCH RBC QN AUTO: 30.2 PG (ref 26.5–33)
MCHC RBC AUTO-ENTMCNC: 33.2 G/DL (ref 31.5–36.5)
MCV RBC AUTO: 91 FL (ref 78–100)
MONOCYTES # BLD AUTO: 0.8 10E9/L (ref 0–1.3)
MONOCYTES NFR BLD AUTO: 6 %
NEUTROPHILS # BLD AUTO: 10.8 10E9/L (ref 1.6–8.3)
NEUTROPHILS NFR BLD AUTO: 77.1 %
PLATELET # BLD AUTO: 223 10E9/L (ref 150–450)
POTASSIUM SERPL-SCNC: 4.9 MMOL/L (ref 3.4–5.3)
PROT SERPL-MCNC: 8.4 G/DL (ref 6.8–8.8)
RBC # BLD AUTO: 4.87 10E12/L (ref 4.4–5.9)
SODIUM SERPL-SCNC: 138 MMOL/L (ref 133–144)
WBC # BLD AUTO: 14 10E9/L (ref 4–11)

## 2021-02-11 PROCEDURE — 99214 OFFICE O/P EST MOD 30 MIN: CPT | Performed by: NURSE PRACTITIONER

## 2021-02-11 PROCEDURE — 85025 COMPLETE CBC W/AUTO DIFF WBC: CPT | Performed by: NURSE PRACTITIONER

## 2021-02-11 PROCEDURE — 71046 X-RAY EXAM CHEST 2 VIEWS: CPT | Performed by: RADIOLOGY

## 2021-02-11 PROCEDURE — 80053 COMPREHEN METABOLIC PANEL: CPT | Performed by: NURSE PRACTITIONER

## 2021-02-11 PROCEDURE — 36415 COLL VENOUS BLD VENIPUNCTURE: CPT | Performed by: NURSE PRACTITIONER

## 2021-02-11 PROCEDURE — 85379 FIBRIN DEGRADATION QUANT: CPT | Performed by: NURSE PRACTITIONER

## 2021-02-11 PROCEDURE — 86769 SARS-COV-2 COVID-19 ANTIBODY: CPT | Performed by: NURSE PRACTITIONER

## 2021-02-11 RX ORDER — BENZONATATE 200 MG/1
200 CAPSULE ORAL 3 TIMES DAILY PRN
Qty: 30 CAPSULE | Refills: 0 | Status: SHIPPED | OUTPATIENT
Start: 2021-02-11 | End: 2021-03-24

## 2021-02-11 RX ORDER — GUAIFENESIN 600 MG/1
1200 TABLET, EXTENDED RELEASE ORAL 2 TIMES DAILY
Qty: 120 TABLET | Refills: 0 | Status: SHIPPED | OUTPATIENT
Start: 2021-02-11 | End: 2021-03-13

## 2021-02-11 ASSESSMENT — MIFFLIN-ST. JEOR: SCORE: 2106.12

## 2021-02-11 NOTE — TELEPHONE ENCOUNTER
"02-09-21 OV- acute bronchitis. Does not feel sx improving with current Tx  z- pack, prednisone, albuterol inhaler.  Reports exertional SOA, \"felt like chest on fire\" this AM when woke. Persistent ST.   No fevers, chills. Thinks had COVID Dec.   Expected to return to work today. Would like F/U today if possible.   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  Offered UC to appt with LIA Foster,today. Pt chose appt with Kristin at 3 PM.  JASPREET Romero RN    "

## 2021-02-11 NOTE — LETTER
February 11, 2021      Hussain Warren  [address] [place] [id number]        To Whom It May Concern:    Hussain Warren  was seen on February 11, 2021.  Please excuse him  until 2/15/2021 due to illness.        Sincerely,        ANAYA Arthur CNP

## 2021-02-11 NOTE — TELEPHONE ENCOUNTER
Notify patient if he needs further time off from work should be reseen if he is not improving over the next couple of days would anticipate does not need any further time off based on the time that I gave him in his letter and again if not improving and worsening patient should be reseen and reevaluated for further time off.    Nika Jamil CNP

## 2021-02-11 NOTE — PROGRESS NOTES
Assessment & Plan     Cough  - XR Chest 2 Views; Future  - General PFT Lab (Please always keep checked); Future  - Pulmonary Function Test; Future  - CBC with platelets and differential  - Comprehensive metabolic panel (BMP + Alb, Alk Phos, ALT, AST, Total. Bili, TP)  - D dimer, quantitative  - COVID-19 Virus (Coronavirus) Antibody Screen, IgG  - benzonatate (TESSALON) 200 MG capsule; Take 1 capsule (200 mg) by mouth 3 times daily as needed for cough  - guaiFENesin (MUCINEX) 600 MG 12 hr tablet; Take 2 tablets (1,200 mg) by mouth 2 times daily    Labs and further work up recommeded.  Trial tessalon and mucinex for cough  Call or return to the clinic with any worsening of symptoms or no resolution. Patient/Parent verbalized understanding and is in agreement. Medication side effects reviewed.   Current Outpatient Medications   Medication Sig Dispense Refill     benzonatate (TESSALON) 200 MG capsule Take 1 capsule (200 mg) by mouth 3 times daily as needed for cough 30 capsule 0     guaiFENesin (MUCINEX) 600 MG 12 hr tablet Take 2 tablets (1,200 mg) by mouth 2 times daily 120 tablet 0     albuterol (PROAIR HFA/PROVENTIL HFA/VENTOLIN HFA) 108 (90 Base) MCG/ACT inhaler Inhale 2 puffs into the lungs every 6 hours as needed for shortness of breath / dyspnea or wheezing 6.7 g 0     Chart documentation with Dragon Voice recognition Software. Although reviewed after completion, some words and grammatical errors may remain.    ANAYA Arthur CNP  [facility]    Abbi Nixon is a 30 year old who presents for the following health issues     HPI       COUGH x 2 yrs  Worse in the past 2 months  covid symptoms December when wife who works at [employer] tested positive.  Very sick for 3 days then better. Now cough  No relief from albuterol  Burning chest sensation.  No SHORTNESS OF BREATH with exertion  No diaphoresis.    About how many days ago did these symptoms start? 2 weeks   Is this  "your first visit for this illness? No   How would you describe your symptoms since your last visit? My symptoms have worsened  In the 14 days before your symptoms started, have you had close contact with someone with COVID-19 (Coronavirus)? I do not know.  Do you have a fever or chills? No  Are you having new or worsening difficulty breathing? Yes   Please describe what kind of difficulty you are having breathing:Mild dyspnea (able to do ADLs without difficulty, mild shortness of breath with activities such as climbing one or two flights of stairs or walking briskly)  Do you have new or worsening cough? Yes, it's a dry cough.   Have you had any new or unexplained body aches? YES    Have you experienced any of the following NEW symptoms?    Headache: YES    Sore throat: YES    Loss of taste or smell: No    Chest pain: No    Diarrhea: No    Rash: No  What treatments have you tried? Prednisone and Zpack, albuterlol  Sick in December after wife tested + for covid He did not have testing done at that time  Would like antibody testing done today    Pain in lungs on right side   Day 3 today of antibiotic not helping   Coughing x 2 yrs  1/2 ppd x 9 yrs.  No family history of lung cancer.    Albuterol Inhaler no relief             Review of Systems   Constitutional, HEENT, cardiovascular, pulmonary, GI, , musculoskeletal, neuro, skin, endocrine and psych systems are negative, except as otherwise noted.      Objective    Pulse 110   Temp 97.4  F (36.3  C)   Resp 14   Ht 1.803 m (5' 11\")   Wt 113.4 kg (250 lb)   SpO2 97%   BMI 34.87 kg/m    Body mass index is 34.87 kg/m .  Physical Exam   GENERAL: healthy, alert and no distress  EYES: Eyes grossly normal to inspection, PERRL and conjunctivae and sclerae normal  HENT: ear canals and TM's normal, nose and mouth without ulcers or lesions  NECK: no adenopathy, no asymmetry, masses, or scars and thyroid normal to palpation  RESP: lungs clear to auscultation - no rales, " rhonchi or wheezes  CV: regular rate and rhythm, normal S1 S2, no S3 or S4, no murmur, click or rub, no peripheral edema and peripheral pulses strong  ABDOMEN: soft, nontender, no hepatosplenomegaly, no masses and bowel sounds normal  MS: no gross musculoskeletal defects noted, no edema  SKIN: no suspicious lesions or rashes  NEURO: Normal strength and tone, mentation intact and speech normal  PSYCH: mentation appears normal, affect normal/bright    Results for orders placed or performed in visit on 02/11/21   XR Chest 2 Views     Status: None    Narrative    XR CHEST 2 VW 2/11/2021 5:01 PM    HISTORY: Cough    COMPARISON: None.      Impression    IMPRESSION: Clear lungs. No pleural effusion or pneumothorax. The  cardiac and mediastinal silhouettes are normal.    KEV SANTOS MD   Results for orders placed or performed in visit on 02/11/21   CBC with platelets and differential     Status: Abnormal   Result Value Ref Range    WBC 14.0 (H) 4.0 - 11.0 10e9/L    RBC Count 4.87 4.4 - 5.9 10e12/L    Hemoglobin 14.7 13.3 - 17.7 g/dL    Hematocrit 44.3 40.0 - 53.0 %    MCV 91 78 - 100 fl    MCH 30.2 26.5 - 33.0 pg    MCHC 33.2 31.5 - 36.5 g/dL    RDW 14.1 10.0 - 15.0 %    Platelet Count 223 150 - 450 10e9/L    % Neutrophils 77.1 %    % Lymphocytes 16.5 %    % Monocytes 6.0 %    % Eosinophils 0.3 %    % Basophils 0.1 %    Absolute Neutrophil 10.8 (H) 1.6 - 8.3 10e9/L    Absolute Lymphocytes 2.3 0.8 - 5.3 10e9/L    Absolute Monocytes 0.8 0.0 - 1.3 10e9/L    Absolute Eosinophils 0.0 0.0 - 0.7 10e9/L    Absolute Basophils 0.0 0.0 - 0.2 10e9/L    Diff Method Automated Method    Comprehensive metabolic panel (BMP + Alb, Alk Phos, ALT, AST, Total. Bili, TP)     Status: Abnormal   Result Value Ref Range    Sodium 138 133 - 144 mmol/L    Potassium 4.9 3.4 - 5.3 mmol/L    Chloride 106 94 - 109 mmol/L    Carbon Dioxide 26 20 - 32 mmol/L    Anion Gap 6 3 - 14 mmol/L    Glucose 108 (H) 70 - 99 mg/dL    Urea Nitrogen 15 7 - 30 mg/dL     Creatinine 1.00 0.66 - 1.25 mg/dL    GFR Estimate >90 >60 mL/min/[1.73_m2]    GFR Estimate If Black >90 >60 mL/min/[1.73_m2]    Calcium 9.7 8.5 - 10.1 mg/dL    Bilirubin Total 0.3 0.2 - 1.3 mg/dL    Albumin 4.4 3.4 - 5.0 g/dL    Protein Total 8.4 6.8 - 8.8 g/dL    Alkaline Phosphatase 128 40 - 150 U/L    ALT 80 (H) 0 - 70 U/L    AST 37 0 - 45 U/L   D dimer, quantitative     Status: None   Result Value Ref Range    D Dimer 0.3 0.0 - 0.50 ug/ml FEU   COVID-19 Virus (Coronavirus) Antibody Screen, IgG     Status: None   Result Value Ref Range    COVID-19 Antibody, IgG Negative NEG^Negative    COVID-19 Antibody, IgG Comment See note

## 2021-02-11 NOTE — PATIENT INSTRUCTIONS
Vitamin C 1000 mg twice a day  Vitamin D 3 5000 international unit(s) daily  mucinex 1200 mg twice a day with large glass of water each dose  Finish azithromycin  Finish the prednisone  Tessalon for the cough.       Patient Education     Deep Coughing    Deep coughing helps keep your lungs clear. If you ve had surgery, this will help you get better faster. Deep coughing also helps you breathe easier and may prevent a lung infection. Follow these steps to do deep coughing.   Step 1    Sit on the edge of a bed or a chair. You can also lie on your back with your knees slightly bent.    Lean forward slightly.    If you've had surgery on your chest or stomach, hold a pillow or rolled-up towel firmly against your cut (incision) with both hands. Hug the pillow.    Breathe out normally.    Step 2    Breathe in slowly and deeply through your nose.    Then breathe out fully through your mouth. Repeat this breathing in and out a second time.    For the third time, take a slow, deep breath through your nose. Fill your lungs with as much air as you can.    Step 3    Cough 2 or 3 times in a row. Try to push all of the air out of your lungs as you cough.    Relax and breathe normally.    Repeat the above steps as directed.    Follow-up care  Follow up with your provider, or as advised.   When to call your healthcare provider  Call your healthcare provider right away if you have any of the following:    Fever of 100.4 F (38 C) or higher, or as directed by your provider    Signs of infection, if you've had surgery. These include redness, swelling, drainage, or warmth at your incision site, or pus or fluid draining from the site    Brownish, white, or bloody sputum    Nausea or vomiting    More pain    Fast or irregular heartbeat  Call 911  Shortness of breath may be a sign of a serious health problem. Call 911if you have shortness of breath that gets worse or have trouble breathing, especially with any of the symptoms below:      Confusion or trouble staying awake    Loss of consciousness or fainting    Chest pain or tightness    Trouble breathing or wheezing    Skin turns blue    Coughing up blood    Severe pain    Dizziness or weakness  MobileHelp last reviewed this educational content on 10/1/2019    5823-5797 [employer], [employer]. [address] [place] [address], [place] [address]. All rights reserved. This information is not intended as a substitute for professional medical care. Always follow your healthcare professional's instructions.           Patient Education     Chronic Cough with Uncertain Cause (Adult)  Everyone has had a cough as part of the common cold, flu, or bronchitis. This kind of cough occurs along with an achy feeling, low-grade fever, nasal and sinus congestion, and a scratchy or sore throat. This usually gets better in 2 to 3 weeks. A cough that lasts longer than 3 weeks may be due to other causes. Your healthcare provider may refer to this as a chronic cough.   If your cough does not improve over the next 2 weeks, further testing may be needed. Follow up with your healthcare provider as advised. Cough suppressants may be recommended. Based on your exam today, the exact cause of your cough is not certain. Below are some common causes for persistent cough.   Smoker's cough  Smoker s cough doesn t go away. If you continue to smoke, it only gets worse. The cough is from irritation in the air passages. Talk to your healthcare provider about quitting. Medicines or nicotine-replacement products, like gum or the patch, may make quitting easier.   Postnasal drip  A cough that is worse at night may be due to postnasal drip. Excess mucus in the nose drains from the back of your nose to your throat. This triggers the cough reflex. Postnasal drip may be due to a sinus infection or allergy. Common allergens include dust, tobacco smoke (both inhaled and secondhand smoke), environmental pollutants, pollen, mold, pets, cleaning  agents, room deodorizers, and chemical fumes. Over-the-counter antihistamines or decongestants may be helpful for allergies. A sinus infection may requires antibiotic treatment. See your healthcare provider if symptoms continue.     Medicines  Certain prescribed medicines can cause a chronic cough in some people:    ACE inhibitors for high blood pressure. These include benazepril, captopril, enalapril, fosinopril, lisinopril, quinapril, ramipril, and others.    Beta-blockers for high blood pressure and other conditions. These include propranolol, atenolol, metoprolol, nadolol, and others.  Let your healthcare provider know if you are taking any of these. The chronic cough may mean your medicine needs to be changed.   Asthma  Cough may be the only sign of mild asthma. You may have tests to find out if asthma is causing your cough. You may also take asthma medicine on a trial basis.   Acid reflux (heartburn, GERD)  The esophagus is the tube that carries food from the mouth to the stomach. A valve at its lower end prevents stomach acids from flowing upward. If this valve does not work properly, acid from the stomach enters the esophagus. This may cause a burning pain in the upper abdomen or lower chest, belching, or cough. Symptoms are often worse when lying flat. Avoid eating or drinking before bedtime. Try using extra pillows to raise your upper body, or place 4-inch blocks under the head of your bed. You may try an over-the-counter (OTC) antacid or an acid-blocking medicine such as famotidine, cimetidine, esomeprazole, lansoprazole, or omeprazole. Stronger medicines for this condition can be prescribed by your healthcare provider. Ask your healthcare provider which OTC medicine to use. Depending on your current medicines, some OTC medicines may cause drug interactions and should be avoided.   Follow-up care  Follow up with your healthcare provider, or as advised, if your cough does not improve. Further testing may  be needed.   Note: If an X-ray was taken, a specialist will review it. You will be notified of any new findings that may affect your care.   When to seek medical advice  Call your healthcare provider right away if any of these occur:    Mild wheezing or difficulty breathing    Fever of 100.4 F (38 C) or higher, or as directed by your healthcare provider    Unexpected weight loss    Coughing up large amounts of colored sputum or blood-tinged sputum    Night sweats (sheets and pajamas get soaking wet)  Call 911  Call 911 if any of these occur:     Coughing up blood    Moderate to severe trouble breathing or wheezing  [employer] last reviewed this educational content on 6/1/2018 2000-2020 [employer], [employer]. [address] [place] [address], [place] [address]. All rights reserved. This information is not intended as a substitute for professional medical care. Always follow your healthcare professional's instructions.

## 2021-02-11 NOTE — TELEPHONE ENCOUNTER
Reason for call:  Patient reporting a symptom    Symptom or request: Pt says he saw Nika on Tuesday and she gave him a note to be off work a couple days. He was coughing on the phone and says he is having problems with his voice. He is asking if Nika could write a note to allow him to be off work today and tomorrow. He will have someone  when ready. Let him know.       Have you been treated for this before? Yes    Additional comments: Call when ready    Phone Number patient can be reached at:  Home number on file [phone] (home)    Best Time:  anytime    Can we leave a detailed message on this number:  YES    Call taken on 2/11/2021 at 9:52 AM by Stacy Hurd

## 2021-02-12 ENCOUNTER — TELEPHONE (OUTPATIENT)
Dept: FAMILY MEDICINE | Facility: CLINIC | Age: 31
End: 2021-02-12

## 2021-02-12 DIAGNOSIS — R06.02 SOB (SHORTNESS OF BREATH) ON EXERTION: ICD-10-CM

## 2021-02-12 DIAGNOSIS — R05.9 COUGH: Primary | ICD-10-CM

## 2021-02-12 LAB
LAB TEST METHOD: NORMAL
SARS-COV-2 AB PNL SERPL IA: NEGATIVE

## 2021-02-12 NOTE — TELEPHONE ENCOUNTER
After review would proceed with PFTs as ordered. Wait on any CT scan.     Brenda Abbott, ECHO, APRN-CNP

## 2021-02-12 NOTE — TELEPHONE ENCOUNTER
Pt informed/ advised as noted per Brenda.  Gave # to scheduled PFT's does not hear in by Tues next week.  Forwarded to Kristin- do you want to order chest CT or wait for PFT results first?  JASPREET Romero RN

## 2021-02-12 NOTE — TELEPHONE ENCOUNTER
Per 02-11-21 CXR-  Telephoned pt and notified of x-ray results - Hussain is wanting to go ahead and do CT and PFT due to having this cough ongoing x 2 years. I told him they will be contacting him to schedule and to call back if he does not here from them next week. Please place orders. SAMANTHA Soto, Kristin Strange, APRN CNP   2/11/2021  5:51 PM CST      Nothing concerning on Xray  Further imaging CT chest PE and PFT recommended with ongoing cough  Take Care,  Kristin Alvarez MSN,FNP-[address] [address]  [phone]        Pt informed 02-11-21 neg COVID antibody, does not want to proceed with COVID testing as has had sx cough, SOA x2 yrs. Neg D Dimer.     See PFT order in, do not see chest CT order.   Please advise in Kristin's absence.  JASPREET Romero RN

## 2021-02-17 ENCOUNTER — HOSPITAL ENCOUNTER (OUTPATIENT)
Dept: CT IMAGING | Facility: CLINIC | Age: 31
Discharge: HOME | End: 2021-02-17
Attending: NURSE PRACTITIONER | Admitting: NURSE PRACTITIONER
Payer: COMMERCIAL

## 2021-02-17 DIAGNOSIS — R06.02 SOB (SHORTNESS OF BREATH) ON EXERTION: ICD-10-CM

## 2021-02-17 DIAGNOSIS — R05.9 COUGH: ICD-10-CM

## 2021-02-17 PROCEDURE — 71275 CT ANGIOGRAPHY CHEST: CPT

## 2021-02-17 PROCEDURE — 250N000009 HC RX 250: Performed by: NURSE PRACTITIONER

## 2021-02-17 PROCEDURE — 250N000011 HC RX IP 250 OP 636: Performed by: NURSE PRACTITIONER

## 2021-02-17 RX ORDER — IOPAMIDOL 755 MG/ML
92 INJECTION, SOLUTION INTRAVASCULAR ONCE
Status: COMPLETED | OUTPATIENT
Start: 2021-02-17 | End: 2021-02-17

## 2021-02-17 RX ADMIN — SODIUM CHLORIDE 100 ML: 9 INJECTION, SOLUTION INTRAVENOUS at 18:09

## 2021-02-17 RX ADMIN — IOPAMIDOL 92 ML: 755 INJECTION, SOLUTION INTRAVENOUS at 18:09

## 2021-02-18 ENCOUNTER — NURSE TRIAGE (OUTPATIENT)
Dept: NURSING | Facility: CLINIC | Age: 31
End: 2021-02-18

## 2021-02-19 ENCOUNTER — APPOINTMENT (OUTPATIENT)
Dept: GENERAL RADIOLOGY | Facility: CLINIC | Age: 31
End: 2021-02-19
Attending: EMERGENCY MEDICINE
Payer: COMMERCIAL

## 2021-02-19 ENCOUNTER — HOSPITAL ENCOUNTER (EMERGENCY)
Facility: CLINIC | Age: 31
Discharge: HOME | End: 2021-02-19
Attending: EMERGENCY MEDICINE | Admitting: EMERGENCY MEDICINE
Payer: COMMERCIAL

## 2021-02-19 VITALS
OXYGEN SATURATION: 97 % | BODY MASS INDEX: 35.79 KG/M2 | WEIGHT: 250 LBS | HEART RATE: 121 BPM | SYSTOLIC BLOOD PRESSURE: 154 MMHG | DIASTOLIC BLOOD PRESSURE: 98 MMHG | RESPIRATION RATE: 16 BRPM | TEMPERATURE: 98.8 F | HEIGHT: 70 IN

## 2021-02-19 DIAGNOSIS — Z77.098 CHLORINE GAS EXPOSURE: ICD-10-CM

## 2021-02-19 PROCEDURE — 99284 EMERGENCY DEPT VISIT MOD MDM: CPT | Performed by: EMERGENCY MEDICINE

## 2021-02-19 PROCEDURE — 250N000009 HC RX 250: Performed by: EMERGENCY MEDICINE

## 2021-02-19 PROCEDURE — 71046 X-RAY EXAM CHEST 2 VIEWS: CPT

## 2021-02-19 PROCEDURE — 99283 EMERGENCY DEPT VISIT LOW MDM: CPT | Mod: 25 | Performed by: EMERGENCY MEDICINE

## 2021-02-19 PROCEDURE — 94640 AIRWAY INHALATION TREATMENT: CPT | Performed by: EMERGENCY MEDICINE

## 2021-02-19 RX ORDER — IPRATROPIUM BROMIDE AND ALBUTEROL SULFATE 2.5; .5 MG/3ML; MG/3ML
3 SOLUTION RESPIRATORY (INHALATION) ONCE
Status: COMPLETED | OUTPATIENT
Start: 2021-02-19 | End: 2021-02-19

## 2021-02-19 RX ADMIN — IPRATROPIUM BROMIDE AND ALBUTEROL SULFATE 3 ML: .5; 3 SOLUTION RESPIRATORY (INHALATION) at 01:40

## 2021-02-19 ASSESSMENT — ENCOUNTER SYMPTOMS
VOICE CHANGE: 0
LIGHT-HEADEDNESS: 0
SHORTNESS OF BREATH: 1
FEVER: 0
CHEST TIGHTNESS: 1
TROUBLE SWALLOWING: 0
SORE THROAT: 0
FATIGUE: 0
EYE PAIN: 1
NUMBNESS: 0
HEADACHES: 1
ABDOMINAL PAIN: 0
CONFUSION: 0
COUGH: 1
CHILLS: 0
RHINORRHEA: 1
WEAKNESS: 0
NAUSEA: 0

## 2021-02-19 ASSESSMENT — MIFFLIN-ST. JEOR: SCORE: 2090.24

## 2021-02-19 NOTE — TELEPHONE ENCOUNTER
"\"I was down in my basement cleaning up a sewage spill with bleach water and I started getting a headache, that's gone now but I am still coughing, having a hard time breathing and pain in my lower left of rib cage. It happened 3 hours ago, I took a steam shower and now I'm outside.\"    Denies other sx at this time  Triaged and advised ED per protocol  Patient prefers to wait a little longer states \"I'm starting to feel better now that I'm outside.\"  Pt agrees to go in if needed  Deneen Harris RN [facility]        Reason for Disposition    [1] Coughing persists > 10 minutes AND [2] after moving to fresh air    Additional Information    Negative: [1] Breathing difficulty persists > 10 minutes AND [2] after moving to fresh air    Negative: [1] Soot in nose or mouth AND [2] no breathing difficulty    Negative: Trapped in smoke-filled room (or other enclosed space) > 10 minutes(Exception: harmless smoke from tobacco or a wood-burning fireplace)    Negative: [1] Wheezing persists > 10 minutes AND [2] after moving to fresh air    Protocols used: SMOKE AND FUME INHALATION-A-AH      "

## 2021-02-19 NOTE — DISCHARGE INSTRUCTIONS
Ok to use albuterol as needed every 3-4 hours to help with your cough. Humidified air such as from a steamy shower can also help your symptoms

## 2021-02-19 NOTE — ED PROVIDER NOTES
History     Chief Complaint   Patient presents with     Toxic Inhalation     inhaled bleach for about 15mins while doing clean-up of basement     HPI  Hussain Warren is a 30 year old male smoker (approximately 5 to 6 cigarettes/day currently) presenting for evaluation of cough with some chest tightness and left-sided pain underneath his left ribs.  Symptoms began while cleaning his basement with chlorine bleach around 1130.  Patient reports that he had a  pipe back up into his basement which was recently fixed however he wanted to clean the smell so poured household bleach over the floor and was scrubbing to clean the area.  Patient reports he very quickly started to have burning in his eyes as well as runny nose and tightness in his throat.  He reports however that he wanted to get the job done so continued working in the basement.  He reports that the basement is of a very old house and there is minimal circulation of air in this enclosed area.  The basement is a fairly large area occupying the foundation of his home.  Patient reports that once his symptoms began, he remained in the area for another 5 to 10 minutes finishing cleaning before exiting the home.  Since exiting the fresh air, patient reports his symptoms have been somewhat improved with resolution of the burning eyes and runny nose.  Still has been having coughing fits and has developed some pain in the left lower lateral part of his rib cage at rest as well as somewhat worsened with movement and especially worse with coughing.  Patient was not using any other cleaning agents and reports feeling well prior to starting the cleaning.    Allergies:  No Known Allergies    Problem List:    Patient Active Problem List    Diagnosis Date Noted     Corneal ulcer of left eye 06/24/2015     Priority: Medium     Tobacco use 02/24/2015     Priority: Medium     Elevated BP 02/24/2015     Priority: Medium     CARDIOVASCULAR SCREENING; LDL GOAL LESS THAN 160  2015     Priority: Medium     Insomnia 2013     Priority: Medium     Bipolar 1 disorder (H) 2013     Priority: Medium     ADHD (attention deficit hyperactivity disorder) 2013     Priority: Medium     Anxiety 2011     Priority: Medium     Other acne 2005     Priority: Medium        Past Medical History:    Past Medical History:   Diagnosis Date     Back pain      Depressive disorder      Other acne        Past Surgical History:    Past Surgical History:   Procedure Laterality Date     ORTHOPEDIC SURGERY      boxer's fracture at age 15       Family History:    Family History   Problem Relation Age of Onset     Hypertension Father         gout     Heart Disease Father      Hypertension Paternal Grandfather      Diabetes Paternal Grandmother         type 2, Alzheimers     Diabetes Paternal Uncle      Hypertension Paternal Uncle      Hypertension Paternal Aunt      Hypertension Paternal Aunt      Breast Cancer Paternal Aunt        Social History:  Marital Status:  Single [1]  Social History     Tobacco Use     Smoking status: Former Smoker     Packs/day: 0.40     Types: Cigarettes     Quit date: 2018     Years since quittin.4     Smokeless tobacco: Never Used   Substance Use Topics     Alcohol use: Yes     Frequency: Monthly or less     Drinks per session: 1 or 2     Comment: Occ     Drug use: No        Medications:    albuterol (PROAIR HFA/PROVENTIL HFA/VENTOLIN HFA) 108 (90 Base) MCG/ACT inhaler  benzonatate (TESSALON) 200 MG capsule  guaiFENesin (MUCINEX) 600 MG 12 hr tablet          Review of Systems   Constitutional: Negative for chills, fatigue and fever.   HENT: Positive for rhinorrhea (During initial exposure, resolved). Negative for congestion, sore throat, trouble swallowing and voice change.    Eyes: Positive for pain (During initial exposure, resolved).   Respiratory: Positive for cough, chest tightness and shortness of breath (Now improving).    Cardiovascular:  "Positive for chest pain (Left lateral lower chest).   Gastrointestinal: Negative for abdominal pain and nausea.   Skin: Negative for rash.   Neurological: Positive for headaches (During initial exposure, resolved). Negative for weakness, light-headedness and numbness.   Psychiatric/Behavioral: Negative for confusion.   All other systems reviewed and are negative.      Physical Exam   BP: (!) 154/98  Pulse: 121  Temp: 98.8  F (37.1  C)  Resp: 18  Height: 177.8 cm (5' 10\")  Weight: 113.4 kg (250 lb)  SpO2: 96 %      Physical Exam  Vitals signs and nursing note reviewed.   Constitutional:       Appearance: Normal appearance. He is not ill-appearing or diaphoretic.   HENT:      Head: Atraumatic.      Nose: Nose normal.      Mouth/Throat:      Mouth: Mucous membranes are moist.   Eyes:      Comments: Bilateral conjunctiva slightly injected   Neck:      Musculoskeletal: Normal range of motion.   Cardiovascular:      Rate and Rhythm: Regular rhythm. Tachycardia present.      Pulses: Normal pulses.   Pulmonary:      Effort: Pulmonary effort is normal.      Breath sounds: Normal breath sounds. No wheezing, rhonchi or rales.   Chest:      Chest wall: No tenderness.   Abdominal:      Palpations: Abdomen is soft.      Tenderness: There is no abdominal tenderness.   Musculoskeletal: Normal range of motion.   Skin:     General: Skin is warm and dry.      Capillary Refill: Capillary refill takes less than 2 seconds.   Neurological:      Mental Status: He is alert and oriented to person, place, and time.   Psychiatric:         Mood and Affect: Mood normal.         ED Course        Procedures                 Results for orders placed or performed during the hospital encounter of 02/19/21   XR Chest 2 Views     Status: None    Narrative    EXAM: CHEST 2 VIEWS  LOCATION: [facility]  DATE/TIME: 2/19/2021 1:30 AM    INDICATION: Exposure to chlorine gas.    COMPARISON: 2/11/2021.    FINDINGS: The lungs are clear. Normal " size cardiac silhouette.      Impression    IMPRESSION: No evidence of active cardiopulmonary disease.         Medications   ipratropium - albuterol 0.5 mg/2.5 mg/3 mL (DUONEB) neb solution 3 mL (3 mLs Nebulization Given 2/19/21 0140)     No data found.    1:56 AM: Discussed with Ana at [place] poison control. Albuterol, humidified oxygen, steam at home. If symptoms improving, safe to go home.    1:59 AM Patient re-assessed: Resting comfortably.  Symptoms improved after neb.  No respiratory distress or hypoxia.  Lungs remain clear.  Advised him of poison control recommendations.  Also discussed airing out of the home by opening window and avoiding going to the basement for the next 24 hours to reduce chance of ongoing exposure.        Assessments & Plan (with Medical Decision Making)  30-year-old male smoker presenting for evaluation of cough with chest tightness and difficulty breathing which began while cleaning in his basement using chlorine bleach.  Patient had exposure of approximately 10 to 15 minutes before evacuating to fresh air.  Symptoms have been gradually improving since initial exposure but still had some chest tightness in the ED.  Given a neb with improvement in his symptoms.  Consulted with poison control who recommended no need for further observation given his improvement without intervention and reassuring vital signs and x-ray.  Patient advised to monitor for any worsening symptoms but unlikely to have any significant further symptoms.     I have reviewed the nursing notes.    I have reviewed the findings, diagnosis, plan and need for follow up with the patient.       Discharge Medication List as of 2/19/2021  2:16 AM          Final diagnoses:   Chlorine gas exposure       2/19/2021   [facility] EMERGENCY DEPT     Benitez, Dimas Nixon MD  02/20/21 1248

## 2021-02-19 NOTE — ED TRIAGE NOTES
Here after cleaning up sewage spill in basement, in enclosed area for about 15mins around 2000 with oughing fits since, is undergoing w/o for previous issues with pulmonologist.

## 2021-03-04 ENCOUNTER — HOSPITAL ENCOUNTER (OUTPATIENT)
Dept: RESPIRATORY THERAPY | Facility: CLINIC | Age: 31
End: 2021-03-04
Attending: INTERNAL MEDICINE
Payer: COMMERCIAL

## 2021-03-04 DIAGNOSIS — R05.9 COUGH: ICD-10-CM

## 2021-03-04 PROCEDURE — 94726 PLETHYSMOGRAPHY LUNG VOLUMES: CPT | Mod: 26 | Performed by: INTERNAL MEDICINE

## 2021-03-04 PROCEDURE — 94729 DIFFUSING CAPACITY: CPT | Mod: 26 | Performed by: INTERNAL MEDICINE

## 2021-03-04 PROCEDURE — 94010 BREATHING CAPACITY TEST: CPT

## 2021-03-04 PROCEDURE — 94726 PLETHYSMOGRAPHY LUNG VOLUMES: CPT

## 2021-03-04 PROCEDURE — 94729 DIFFUSING CAPACITY: CPT

## 2021-03-04 PROCEDURE — 94010 BREATHING CAPACITY TEST: CPT | Mod: 26 | Performed by: INTERNAL MEDICINE

## 2021-03-06 LAB
DLCOUNC-%PRED-PRE: 102 %
DLCOUNC-PRE: 32.58 ML/MIN/MMHG
DLCOUNC-PRED: 31.9 ML/MIN/MMHG
ERV-%PRED-PRE: 103 %
ERV-PRE: 1.16 L
ERV-PRED: 1.12 L
EXPTIME-PRE: 7.21 SEC
FEF2575-%PRED-PRE: 87 %
FEF2575-PRE: 3.88 L/SEC
FEF2575-PRED: 4.42 L/SEC
FEFMAX-%PRED-PRE: 87 %
FEFMAX-PRE: 8.84 L/SEC
FEFMAX-PRED: 10.15 L/SEC
FEV1-%PRED-PRE: 97 %
FEV1-PRE: 4.27 L
FEV1FEV6-PRE: 79 %
FEV1FEV6-PRED: 83 %
FEV1FVC-PRE: 79 %
FEV1FVC-PRED: 82 %
FEV1SVC-PRE: 79 %
FEV1SVC-PRED: 79 %
FIFMAX-PRE: 4.36 L/SEC
FRCPLETH-%PRED-PRE: 109 %
FRCPLETH-PRE: 3.64 L
FRCPLETH-PRED: 3.33 L
FVC-%PRED-PRE: 101 %
FVC-PRE: 5.38 L
FVC-PRED: 5.32 L
IC-%PRED-PRE: 95 %
IC-PRE: 4.21 L
IC-PRED: 4.42 L
RVPLETH-%PRED-PRE: 135 %
RVPLETH-PRE: 2.43 L
RVPLETH-PRED: 1.79 L
TLCPLETH-%PRED-PRE: 111 %
TLCPLETH-PRE: 7.84 L
TLCPLETH-PRED: 7.02 L
VA-%PRED-PRE: 106 %
VA-PRE: 7 L
VC-%PRED-PRE: 97 %
VC-PRE: 5.41 L
VC-PRED: 5.55 L

## 2021-03-11 ENCOUNTER — TELEPHONE (OUTPATIENT)
Dept: FAMILY MEDICINE | Facility: CLINIC | Age: 31
End: 2021-03-11

## 2021-03-11 DIAGNOSIS — R06.02 SOBOE (SHORTNESS OF BREATH ON EXERTION): Primary | ICD-10-CM

## 2021-03-24 ENCOUNTER — OFFICE VISIT (OUTPATIENT)
Dept: URGENT CARE | Facility: URGENT CARE | Age: 31
End: 2021-03-24
Payer: COMMERCIAL

## 2021-03-24 VITALS
SYSTOLIC BLOOD PRESSURE: 128 MMHG | TEMPERATURE: 97.6 F | WEIGHT: 260 LBS | BODY MASS INDEX: 37.31 KG/M2 | DIASTOLIC BLOOD PRESSURE: 88 MMHG | HEART RATE: 88 BPM | RESPIRATION RATE: 15 BRPM

## 2021-03-24 DIAGNOSIS — M62.830 BACK MUSCLE SPASM: ICD-10-CM

## 2021-03-24 DIAGNOSIS — S39.012A BACK STRAIN, INITIAL ENCOUNTER: Primary | ICD-10-CM

## 2021-03-24 PROCEDURE — 99213 OFFICE O/P EST LOW 20 MIN: CPT | Performed by: NURSE PRACTITIONER

## 2021-03-24 RX ORDER — CHOLECALCIFEROL (VITAMIN D3) 50 MCG
1 TABLET ORAL DAILY
COMMUNITY

## 2021-03-24 RX ORDER — MULTIVIT-MIN/IRON/FOLIC ACID/K 18-600-40
2 CAPSULE ORAL DAILY
COMMUNITY

## 2021-03-24 RX ORDER — METHOCARBAMOL 750 MG/1
750 TABLET, FILM COATED ORAL 4 TIMES DAILY PRN
Qty: 40 TABLET | Refills: 0 | Status: SHIPPED | OUTPATIENT
Start: 2021-03-24 | End: 2021-08-30

## 2021-03-24 ASSESSMENT — PAIN SCALES - GENERAL: PAINLEVEL: MILD PAIN (3)

## 2021-03-24 NOTE — LETTER
March 24, 2021      Hussain Warren  [address] [address]        To Whom It May Concern:    Hussain Warren was seen in our clinic. Please release from work today and for the next 2 days.      Sincerely,        ANAYA Hawley CNP

## 2021-03-24 NOTE — PATIENT INSTRUCTIONS
Increase rest and fluids. Take 400 mg Ibuprofen with each muscle relaxer with food.  If your symptoms worsen or do not resolve follow up with your primary care provider in 1 week and sooner if needed.     Moist heat to back spasm   Indications for emergent return to emergency department discussed with patient, who verbalized good understanding and agreement.  Patient understands the limitations of today's evaluation.           Patient Education     Back Sprain or Strain     Injury to the muscles (strain) or ligaments (sprain) around the spine can be troubling. Injury may occur after a sudden forceful twisting or bending such as in a car accident, after a simple awkward movement, or after lifting something heavy with poor body positioning. In any case, muscle spasm is often present and adds to the pain.  Thankfully, most people feel better in 1 to 2 weeks. Most of the rest feel better in 1 to 2 months. Most people can remain active. Unless you had a forceful or traumatic physical injury such as a car accident or fall, X-rays may not be done for the first assessment of a back sprain or strain. If pain continues and doesn't respond to medical treatment, your healthcare provider may then do X-rays and other tests.  Home care  These guidelines will help you care for your injury at home:    When in bed, try to find a comfortable position. A firm mattress is best. Try lying flat on your back with pillows under your knees. You can also try lying on your side with your knees bent up toward your chest and a pillow between your knees.    Don't sit for long periods. Try not to take long car rides or take other trips that have you sitting for a long time. This puts more stress on the lower back than standing or walking.    During the first 24 to 72 hours after an injury or flare-up, put an ice pack on the painful area for 20 minutes. Then remove it for 20 minutes. Do this for 60 to 90 minutes, or several times a day. This will  reduce swelling and pain. Always wrap the ice pack in a thin towel or plastic to protect your skin.    You can start with ice, then switch to heat. Heat from a hot shower, hot bath, or heating pad reduces pain and works well for muscle spasms. Put heat on the painful area for 20 minutes, then remove for 20 minutes. Do this for 60 to 90 minutes, or several times a day. Don't use a heating pad while sleeping. It can burn the skin.    You can alternate the ice and heat. Talk with your healthcare provider to find out the best treatment or therapy for your back pain.    Therapeutic massage can help relax the back muscles without stretching them.    Be aware of safe lifting methods. Don't lift anything over 15 pounds until all of the pain is gone.  Medicines  Talk with your healthcare provider before using medicines, especially if you have other health problems or are taking other medicines.    You may use over-the-counter medicines such as acetaminophen, ibuprofen, or naproxen to control pain, unless another pain medicine was prescribed. Talk with your healthcare provider before taking any medicines if you have a chronic condition such as diabetes, liver or kidney disease, stomach ulcers, or digestive bleeding, or are taking blood-thinner medicines.    Be careful if you are given prescription medicines, opioids, or medicine for muscle spasm. They can cause drowsiness, and affect your coordination, reflexes, and judgment. Don't drive or operate heavy machinery when taking these types of medicines. Only take pain medicine as prescribed by your healthcare provider.  Follow-up care  Follow up with your healthcare provider, or as advised. You may need physical therapy or more tests if your symptoms get worse.  If you had X-rays, your healthcare provider may be checking for any broken bones, breaks, or fractures. Bruises and sprains can sometimes hurt as much as a fracture. These injuries can take time to heal fully. If your  symptoms don t get better or they get worse, talk with your healthcare provider. You may need a repeat X-ray or other tests.  Call 911  Call 911 if any of the following occur:    Trouble breathing    Confused    Very drowsy or trouble awakening    Fainting or loss of consciousness    Rapid or very slow heart rate    Loss of bowel or bladder control  When to seek medical advice  Call your healthcare provider right away if any of the following occur:    Pain gets worse or spreads to your arms or legs    Weakness or numbness in one or both arms or legs    Numbness in the groin or genital area  [employer] last reviewed this educational content on 11/1/2019 2000-2020 [employer]. All rights reserved. This information is not intended as a substitute for professional medical care. Always follow your healthcare professional's instructions.           Patient Education     Back Spasm     Spasm of the back muscles can occur after a sudden forceful twisting or bending such as in a car accident. A spasm can also happen after a simple awkward movement, or after lifting something heavy with poor body positioning. In any case, muscle spasm adds to the pain. Sleeping in an awkward position or on a poor quality mattress can also cause this. Some people respond to emotional stress by tensing the muscles of their back.  Pain that continues may need further assessment or other types of treatment such as physical therapy.  You don't always need X-rays for the first assessment of back pain, unless you had a physical injury such as from a car accident or fall. If your pain continues and doesn't respond to medical treatment, X-rays and other tests may then be done.   Home care    As soon as possible, start sitting or walking again. This will help prevent problems from a long bed rest. These problems include muscle weakness, worsening back stiffness and pain, and blood clots in the legs.    When in bed, try to find a position  of comfort. A firm mattress is best. Try lying flat on your back with pillows under your knees. You can also try lying on your side with your knees bent up toward your chest and a pillow between your knees.    Don't sit for long periods. Also limit car rides and travel. This puts more stress on the lower back than standing or walking.     During the first 24 to 72 hours after an injury or flare-up, put an ice pack on the painful area for 20 minutes, then remove it for 20 minutes. Do this over a period of 60 to 90 minutes, or several times a day. This will reduce swelling and pain. Always wrap ice packs in a thin towel.    You can start with ice, then switch to heat. Heat from a hot shower, hot bath, or heating pad reduces pain and works well for muscle spasms. Put heat on the painful area for 20 minutes, then remove it for 20 minutes. Do this over a period of 60 to 90 minutes, or several times a day. Don't sleep on a heating pad. It can burn or damage skin.    Alternate using ice and heat.    Be aware of safe lifting methods. don't lift anything over 15 pounds until all the pain is gone.  Gentle stretching will help your back heal faster. Do this simple routine 2 to 3 times a day until your back is feeling better.    Lie on your back with your knees bent and both feet on the ground.    Slowly raise your left knee to your chest as you flatten your lower back against the floor. Hold for 20 to 30 seconds.    Relax and repeat the exercise with your right knee.    Do 2 to 3 of these exercises for each leg.    Repeat, hugging both knees to your chest at the same time.    Don't bounce, but use a gentle pull.  Medicines  Talk with your doctor before using medicine, especially if you have other medical problems or are taking other medicines.  You may use over-the-counter medicines such as acetaminophen, ibuprofen, or naprosyn to control pain, unless your healthcare provider prescribed another pain medicine. Talk with your  healthcare provider if you have a chronic condition such as diabetes, liver or kidney disease, stomach ulcer, or digestive bleeding, or are taking blood thinners.  Be careful if you are given prescription pain medicine, opioids, or medicine for muscle spasm. They can cause drowsiness, and affect your coordination, reflexes, and judgment. Don't drive or operate heavy machinery when taking these medicines. Take pain medicine only as prescribed by your healthcare provider.  Follow-up care  Follow up with your doctor, or as advised. You may need physical therapy or more tests.  If X-rays were taken, they may be reviewed by a radiologist. You will be told of any new findings that may affect your care.  Call   Call if any of these occur:    Trouble breathing    Confusion    Drowsiness or trouble awakening    Fainting or loss of consciousness    Rapid or very slow heart rate    Loss of bowel or bladder control  When to seek medical advice  Call your healthcare provider right away if any of these occur:    Pain becomes worse or spreads to your legs    Weakness or numbness in one or both legs    Numbness in the groin or genital area    Fever of 100.4 F (38 C) or higher , or as directed by your healthcare provider    Chills    Burning or pain when passing urine  [employer] last reviewed this educational content on 11/1/2018 2000-2020 [employer]. All rights reserved. This information is not intended as a substitute for professional medical care. Always follow your healthcare professional's instructions.

## 2021-03-24 NOTE — PROGRESS NOTES
"    Assessment & Plan   Problem List Items Addressed This Visit     None      Visit Diagnoses     Back strain, initial encounter    -  Primary    Relevant Medications    methocarbamol (ROBAXIN) 750 MG tablet    Back muscle spasm        Relevant Medications    methocarbamol (ROBAXIN) 750 MG tablet             20 minutes spent on the date of the encounter doing chart review, history and exam, documentation and further activities as noted above         BMI:   Estimated body mass index is 37.31 kg/m  as calculated from the following:    Height as of 2/19/21: 1.778 m (5' 10\").    Weight as of this encounter: 117.9 kg (260 lb).   Weight management plan: Patient was referred to their PCP to discuss a diet and exercise plan.    Patient Instructions   Increase rest and fluids. Take 400 mg Ibuprofen with each muscle relaxer with food.  If your symptoms worsen or do not resolve follow up with your primary care provider in 1 week and sooner if needed.     Moist heat to back spasm   Indications for emergent return to emergency department discussed with patient, who verbalized good understanding and agreement.  Patient understands the limitations of today's evaluation.           Patient Education     Back Sprain or Strain     Injury to the muscles (strain) or ligaments (sprain) around the spine can be troubling. Injury may occur after a sudden forceful twisting or bending such as in a car accident, after a simple awkward movement, or after lifting something heavy with poor body positioning. In any case, muscle spasm is often present and adds to the pain.  Thankfully, most people feel better in 1 to 2 weeks. Most of the rest feel better in 1 to 2 months. Most people can remain active. Unless you had a forceful or traumatic physical injury such as a car accident or fall, X-rays may not be done for the first assessment of a back sprain or strain. If pain continues and doesn't respond to medical treatment, your healthcare provider " may then do X-rays and other tests.  Home care  These guidelines will help you care for your injury at home:    When in bed, try to find a comfortable position. A firm mattress is best. Try lying flat on your back with pillows under your knees. You can also try lying on your side with your knees bent up toward your chest and a pillow between your knees.    Don't sit for long periods. Try not to take long car rides or take other trips that have you sitting for a long time. This puts more stress on the lower back than standing or walking.    During the first 24 to 72 hours after an injury or flare-up, put an ice pack on the painful area for 20 minutes. Then remove it for 20 minutes. Do this for 60 to 90 minutes, or several times a day. This will reduce swelling and pain. Always wrap the ice pack in a thin towel or plastic to protect your skin.    You can start with ice, then switch to heat. Heat from a hot shower, hot bath, or heating pad reduces pain and works well for muscle spasms. Put heat on the painful area for 20 minutes, then remove for 20 minutes. Do this for 60 to 90 minutes, or several times a day. Don't use a heating pad while sleeping. It can burn the skin.    You can alternate the ice and heat. Talk with your healthcare provider to find out the best treatment or therapy for your back pain.    Therapeutic massage can help relax the back muscles without stretching them.    Be aware of safe lifting methods. Don't lift anything over 15 pounds until all of the pain is gone.  Medicines  Talk with your healthcare provider before using medicines, especially if you have other health problems or are taking other medicines.    You may use over-the-counter medicines such as acetaminophen, ibuprofen, or naproxen to control pain, unless another pain medicine was prescribed. Talk with your healthcare provider before taking any medicines if you have a chronic condition such as diabetes, liver or kidney disease, stomach  ulcers, or digestive bleeding, or are taking blood-thinner medicines.    Be careful if you are given prescription medicines, opioids, or medicine for muscle spasm. They can cause drowsiness, and affect your coordination, reflexes, and judgment. Don't drive or operate heavy machinery when taking these types of medicines. Only take pain medicine as prescribed by your healthcare provider.  Follow-up care  Follow up with your healthcare provider, or as advised. You may need physical therapy or more tests if your symptoms get worse.  If you had X-rays, your healthcare provider may be checking for any broken bones, breaks, or fractures. Bruises and sprains can sometimes hurt as much as a fracture. These injuries can take time to heal fully. If your symptoms don t get better or they get worse, talk with your healthcare provider. You may need a repeat X-ray or other tests.  Call 911  Call 911 if any of the following occur:    Trouble breathing    Confused    Very drowsy or trouble awakening    Fainting or loss of consciousness    Rapid or very slow heart rate    Loss of bowel or bladder control  When to seek medical advice  Call your healthcare provider right away if any of the following occur:    Pain gets worse or spreads to your arms or legs    Weakness or numbness in one or both arms or legs    Numbness in the groin or genital area  [employer] last reviewed this educational content on 11/1/2019 2000-2020 [employer]. All rights reserved. This information is not intended as a substitute for professional medical care. Always follow your healthcare professional's instructions.           Patient Education     Back Spasm     Spasm of the back muscles can occur after a sudden forceful twisting or bending such as in a car accident. A spasm can also happen after a simple awkward movement, or after lifting something heavy with poor body positioning. In any case, muscle spasm adds to the pain. Sleeping in an  awkward position or on a poor quality mattress can also cause this. Some people respond to emotional stress by tensing the muscles of their back.  Pain that continues may need further assessment or other types of treatment such as physical therapy.  You don't always need X-rays for the first assessment of back pain, unless you had a physical injury such as from a car accident or fall. If your pain continues and doesn't respond to medical treatment, X-rays and other tests may then be done.   Home care    As soon as possible, start sitting or walking again. This will help prevent problems from a long bed rest. These problems include muscle weakness, worsening back stiffness and pain, and blood clots in the legs.    When in bed, try to find a position of comfort. A firm mattress is best. Try lying flat on your back with pillows under your knees. You can also try lying on your side with your knees bent up toward your chest and a pillow between your knees.    Don't sit for long periods. Also limit car rides and travel. This puts more stress on the lower back than standing or walking.     During the first 24 to 72 hours after an injury or flare-up, put an ice pack on the painful area for 20 minutes, then remove it for 20 minutes. Do this over a period of 60 to 90 minutes, or several times a day. This will reduce swelling and pain. Always wrap ice packs in a thin towel.    You can start with ice, then switch to heat. Heat from a hot shower, hot bath, or heating pad reduces pain and works well for muscle spasms. Put heat on the painful area for 20 minutes, then remove it for 20 minutes. Do this over a period of 60 to 90 minutes, or several times a day. Don't sleep on a heating pad. It can burn or damage skin.    Alternate using ice and heat.    Be aware of safe lifting methods. don't lift anything over 15 pounds until all the pain is gone.  Gentle stretching will help your back heal faster. Do this simple routine 2 to 3  times a day until your back is feeling better.    Lie on your back with your knees bent and both feet on the ground.    Slowly raise your left knee to your chest as you flatten your lower back against the floor. Hold for 20 to 30 seconds.    Relax and repeat the exercise with your right knee.    Do 2 to 3 of these exercises for each leg.    Repeat, hugging both knees to your chest at the same time.    Don't bounce, but use a gentle pull.  Medicines  Talk with your doctor before using medicine, especially if you have other medical problems or are taking other medicines.  You may use over-the-counter medicines such as acetaminophen, ibuprofen, or naprosyn to control pain, unless your healthcare provider prescribed another pain medicine. Talk with your healthcare provider if you have a chronic condition such as diabetes, liver or kidney disease, stomach ulcer, or digestive bleeding, or are taking blood thinners.  Be careful if you are given prescription pain medicine, opioids, or medicine for muscle spasm. They can cause drowsiness, and affect your coordination, reflexes, and judgment. Don't drive or operate heavy machinery when taking these medicines. Take pain medicine only as prescribed by your healthcare provider.  Follow-up care  Follow up with your doctor, or as advised. You may need physical therapy or more tests.  If X-rays were taken, they may be reviewed by a radiologist. You will be told of any new findings that may affect your care.  Call   Call if any of these occur:    Trouble breathing    Confusion    Drowsiness or trouble awakening    Fainting or loss of consciousness    Rapid or very slow heart rate    Loss of bowel or bladder control  When to seek medical advice  Call your healthcare provider right away if any of these occur:    Pain becomes worse or spreads to your legs    Weakness or numbness in one or both legs    Numbness in the groin or genital area    Fever of 100.4 F (38 C) or higher , or as  directed by your healthcare provider    Chills    Burning or pain when passing urine  [employer] last reviewed this educational content on 11/1/2018 2000-2020 [employer]. All rights reserved. This information is not intended as a substitute for professional medical care. Always follow your healthcare professional's instructions.               Return in about 1 week (around 3/31/2021), or if symptoms worsen or fail to improve, for Follow up with your primary care provider.    Talisha House, ANAYA CNP  [facility]    Abbi Nixon is a 30 year old who presents for the following health issues     HPI     Chief Complaint   Patient presents with     Back Pain     When jerking a steel pin free he injured left lower back DOI 2/23/21     Was pulling on a trailer hitch trying to get it off of his truck and pulled his left lower back.      Review of Systems   Constitutional, HEENT, cardiovascular, pulmonary, GI, , musculoskeletal, neuro, skin, endocrine and psych systems are negative, except as otherwise noted.      Objective    /88 (BP Location: Right arm, Patient Position: Sitting, Cuff Size: Adult Large)   Pulse 88   Temp 97.6  F (36.4  C) (Tympanic)   Resp 15   Wt 117.9 kg (260 lb)   BMI 37.31 kg/m    Body mass index is 37.31 kg/m .  Physical Exam   GENERAL: healthy, alert and in mild distress with left low back pain, nontoxic in appearance  EYES: Eyes grossly normal to inspection, PERRL and conjunctivae and sclerae normal  HENT: normocephalic  NECK: supple with full ROM  RESP: lungs clear to auscultation - no rales, rhonchi or wheezes  CV: regular rate and rhythm, normal S1 S2, no S3 or S4, no murmur, click or rub, no peripheral edema   ABDOMEN: soft, nontender  MS: spine nontender to palpation with normal curvature and alignment. Has significant muscle spasm in left lower back.     No results found for this or any previous visit (from the past 24  hour(s)).

## 2021-04-13 ENCOUNTER — VIRTUAL VISIT (OUTPATIENT)
Dept: FAMILY MEDICINE | Facility: CLINIC | Age: 31
End: 2021-04-13
Payer: COMMERCIAL

## 2021-04-13 DIAGNOSIS — Z20.822 SUSPECTED COVID-19 VIRUS INFECTION: Primary | ICD-10-CM

## 2021-04-13 PROCEDURE — 99213 OFFICE O/P EST LOW 20 MIN: CPT | Mod: 95 | Performed by: NURSE PRACTITIONER

## 2021-04-13 NOTE — PROGRESS NOTES
Hussain is a 30 year old who is being evaluated via a billable video visit.      How would you like to obtain your AVS? Mail a copy  If the video visit is dropped, the invitation should be resent by: Text to cell phone: . [phone]  Will anyone else be joining your video visit? No      Video Start Time: 12:48 PM    Assessment & Plan     (Z20.822) Suspected COVID-19 virus infection  (primary encounter diagnosis)  Comment: Patient son sent home with a cold does have a history of bronchitis is presently using nebs for relief.  Based on signs and symptoms patient cannot return to work without a negative Covid test requests a Covid test.  Son has not been tested for Covid.  Will test patient for Covid did review with patient the process of obtaining a test if negative and allowed to come back to work patient should be cautious if he develops any symptoms and should have a retest.  Plan: Asymptomatic COVID-19 Virus (Coronavirus) by         PCR               Tobacco Cessation:   reports that he has been smoking cigarettes. He has been smoking about 0.40 packs per day. He has never used smokeless tobacco.  Tobacco Cessation Action Plan: Information offered: Patient not interested at this time    See Patient Instructions    No follow-ups on file.    ANAYA Bojorquez CNP  [facility]    Subjective   Hussain is a 30 year old who presents for the following health issues     HPI       Concern for COVID-19  About how many days ago did these symptoms start? none  Is this your first visit for this illness? Yes  In the 14 days before your symptoms started, have you had close contact with someone with COVID-19 (Coronavirus)? Yes--- Son was sent home from  yesterday with a cough. Patient does not want to have his son tested for COVID. Patient's work states that he can not return to work unless he has a negative COVID test or will have to quarantine for 10 days. Patient needs to work so would like  COVID test order.   Do you have a fever or chills? No  Are you having new or worsening difficulty breathing? No  Do you have new or worsening cough? No  Have you had any new or unexplained body aches? No    Have you experienced any of the following NEW symptoms?    Headache: No    Sore throat: No    Loss of taste or smell: No    Chest pain: No    Diarrhea: No    Rash: No  What treatments have you tried? 0  Who do you live with? Fiance, and 2 children  Are you, or a household member, a healthcare worker or a ? YES fiance  Do you live in a nursing home, group home, or shelter? No  Do you have a way to get food/medications if quarantined? Yes, I have a friend or family member who can help me.        Review of Systems   Constitutional, HEENT, cardiovascular, pulmonary, gi and gu systems are negative, except as otherwise noted.      Objective           Vitals:  No vitals were obtained today due to virtual visit.    Physical Exam   GENERAL: Healthy, alert and no distress  EYES: Eyes grossly normal to inspection.  No discharge or erythema, or obvious scleral/conjunctival abnormalities.  RESP: No audible wheeze, cough, or visible cyanosis.  No visible retractions or increased work of breathing.    SKIN: Visible skin clear. No significant rash, abnormal pigmentation or lesions.  NEURO: Cranial nerves grossly intact.  Mentation and speech appropriate for age.  PSYCH: Mentation appears normal, affect normal/bright, judgement and insight intact, normal speech and appearance well-groomed.    No results found for any visits on 04/13/21.            Video-Visit Details    Type of service:  Video Visit    Video End Time:12:54 PM    Originating Location (pt. Location): Home    Distant Location (provider location):  [facility]     Platform used for Video Visit: Wendi

## 2021-04-15 DIAGNOSIS — Z20.822 SUSPECTED COVID-19 VIRUS INFECTION: ICD-10-CM

## 2021-05-29 ENCOUNTER — HEALTH MAINTENANCE LETTER (OUTPATIENT)
Age: 31
End: 2021-05-29

## 2021-07-06 ENCOUNTER — ANCILLARY PROCEDURE (OUTPATIENT)
Dept: GENERAL RADIOLOGY | Facility: CLINIC | Age: 31
End: 2021-07-06
Attending: PHYSICIAN ASSISTANT
Payer: COMMERCIAL

## 2021-07-06 ENCOUNTER — OFFICE VISIT (OUTPATIENT)
Dept: URGENT CARE | Facility: URGENT CARE | Age: 31
End: 2021-07-06
Payer: COMMERCIAL

## 2021-07-06 VITALS
BODY MASS INDEX: 37.22 KG/M2 | OXYGEN SATURATION: 99 % | HEART RATE: 69 BPM | RESPIRATION RATE: 16 BRPM | DIASTOLIC BLOOD PRESSURE: 88 MMHG | TEMPERATURE: 96.9 F | SYSTOLIC BLOOD PRESSURE: 144 MMHG | HEIGHT: 70 IN | WEIGHT: 260 LBS

## 2021-07-06 DIAGNOSIS — S69.91XA HAND INJURY, RIGHT, INITIAL ENCOUNTER: Primary | ICD-10-CM

## 2021-07-06 DIAGNOSIS — M25.531 ACUTE PAIN OF RIGHT WRIST: ICD-10-CM

## 2021-07-06 DIAGNOSIS — S99.922A TOE INJURY, LEFT, INITIAL ENCOUNTER: ICD-10-CM

## 2021-07-06 DIAGNOSIS — R03.0 ELEVATED BP WITHOUT DIAGNOSIS OF HYPERTENSION: ICD-10-CM

## 2021-07-06 PROCEDURE — 99213 OFFICE O/P EST LOW 20 MIN: CPT | Mod: 25 | Performed by: PHYSICIAN ASSISTANT

## 2021-07-06 PROCEDURE — 73110 X-RAY EXAM OF WRIST: CPT | Mod: RT | Performed by: RADIOLOGY

## 2021-07-06 PROCEDURE — 73630 X-RAY EXAM OF FOOT: CPT | Mod: LT | Performed by: RADIOLOGY

## 2021-07-06 PROCEDURE — 90715 TDAP VACCINE 7 YRS/> IM: CPT | Performed by: PHYSICIAN ASSISTANT

## 2021-07-06 PROCEDURE — 90471 IMMUNIZATION ADMIN: CPT | Performed by: PHYSICIAN ASSISTANT

## 2021-07-06 PROCEDURE — 73130 X-RAY EXAM OF HAND: CPT | Mod: RT | Performed by: RADIOLOGY

## 2021-07-06 RX ORDER — NAPROXEN 500 MG/1
500 TABLET ORAL 2 TIMES DAILY WITH MEALS
Qty: 60 TABLET | Refills: 1 | Status: SHIPPED | OUTPATIENT
Start: 2021-07-06 | End: 2021-08-30

## 2021-07-06 RX ORDER — CEPHALEXIN 500 MG/1
500 CAPSULE ORAL 3 TIMES DAILY
Qty: 21 CAPSULE | Refills: 0 | Status: SHIPPED | OUTPATIENT
Start: 2021-07-06 | End: 2021-07-13

## 2021-07-06 ASSESSMENT — MIFFLIN-ST. JEOR: SCORE: 2135.6

## 2021-07-06 ASSESSMENT — PAIN SCALES - GENERAL: PAINLEVEL: EXTREME PAIN (9)

## 2021-07-06 NOTE — PATIENT INSTRUCTIONS
Monitor blood pressure at home and if average pressure is > or equal to 140/90 then follow up with primary care provider

## 2021-07-06 NOTE — LETTER
[facility]RG[facility] [place]  [address] [address]  Phone: [phone]  Fax: [phone]    July 6, 2021        Hussain Warren  [address] [place] [id number]          To whom it may concern:    RE: Hussain Warren    Patient was seen and treated today at our clinic and missed work 07/06/21 through 07/09/21.     Please contact me for questions or concerns.      Sincerely,        Emelia Cagle PA-C

## 2021-07-06 NOTE — NURSING NOTE
Prior to immunization administration, verified patients identity using patient s name and date of birth. Please see Immunization Activity for additional information.     Screening Questionnaire for Adult Immunization    Are you sick today?   No   Do you have allergies to medications, food, a vaccine component or latex?   No   Have you ever had a serious reaction after receiving a vaccination?   No   Do you have a long-term health problem with heart, lung, kidney, or metabolic disease (e.g., diabetes), asthma, a blood disorder, no spleen, complement component deficiency, a cochlear implant, or a spinal fluid leak?  Are you on long-term aspirin therapy?   No   Do you have cancer, leukemia, HIV/AIDS, or any other immune system problem?   No   Do you have a parent, brother, or sister with an immune system problem?   No   In the past 3 months, have you taken medications that affect  your immune system, such as prednisone, other steroids, or anticancer drugs; drugs for the treatment of rheumatoid arthritis, Crohn s disease, or psoriasis; or have you had radiation treatments?   No   Have you had a seizure, or a brain or other nervous system problem?   No   During the past year, have you received a transfusion of blood or blood    products, or been given immune (gamma) globulin or antiviral drug?   No   For women: Are you pregnant or is there a chance you could become       pregnant during the next month?   No   Have you received any vaccinations in the past 4 weeks?   No     Immunization questionnaire answers were all negative.        Per orders of Emelia Cagle PA-C, injection of Adacel given by Selam Akhtar CMA. Patient instructed to remain in clinic for 15 minutes afterwards, and to report any adverse reaction to me immediately.       Screening performed by Selam Akhtar CMA on 7/6/2021 at 1:20 PM.

## 2021-07-06 NOTE — PROGRESS NOTES
Assessment & Plan     Hand injury, right, initial encounter  Reassurance, no acute fracture. Patient declines wrist splint at this time.  Will treat with naproxen x 5-10 days for pain and inflammation. Avoid aggravating factors but encouraged gentle stretching/ROM. Return to clinic if symptoms worsen or do not improve; otherwise follow up as needed.       - XR Hand Right G/E 3 Views; Future  - naproxen (NAPROSYN) 500 MG tablet; Take 1 tablet (500 mg) by mouth 2 times daily (with meals)    Acute pain of right wrist  As above     - XR Wrist Right G/E 3 Views; Future  - naproxen (NAPROSYN) 500 MG tablet; Take 1 tablet (500 mg) by mouth 2 times daily (with meals)    Toe injury, left, initial encounter  Keep abrasions clean and dry. Gave written Rx for keflex x 7 days that he can fill if he notices signs of infection.     - XR Foot Left G/E 3 Views; Future  - TDAP VACCINE (Adacel, Boostrix)  [[id number]]  - naproxen (NAPROSYN) 500 MG tablet; Take 1 tablet (500 mg) by mouth 2 times daily (with meals)  - cephALEXin (KEFLEX) 500 MG capsule; Take 1 capsule (500 mg) by mouth 3 times daily for 7 days    Elevated BP without diagnosis of hypertension  Monitor blood pressure at home and return to clinic if average pressure is > or equal to 140/90.                      Return in about 1 week (around 7/13/2021), or if symptoms worsen or fail to improve.    Emelia Cagle PA-C  [facility] URGENT C[facility] [place]    Subjective   Chief Complaint   Patient presents with     Musculoskeletal Problem     7/3/21 Patient fell when racing his son, left big toe is cut used liquid bandage hurts, right hand is swollen has broken before painful. Tetanus 10/30/2012.       HPI     MS Injury/Pain    Onset of symptoms was 3 day(s) ago.  Location: right wrist/hand and left great toe  Context:       The injury happened while at home      Mechanism: fell racing son      Patient experienced immediate pain  Course of symptoms is same.   "  Severity moderate  Current and Associated symptoms: pain swelling, abrasions   Aggravating Factors: weight-bearing and movement  Therapies to improve symptoms include: ibuprofen 800mg two times daily   Had right boxer's fracture 16 years ago          Review of Systems   Constitutional, HEENT, cardiovascular, pulmonary, gi and gu systems are negative, except as otherwise noted.      Objective    BP (!) 144/88 (BP Location: Right arm, Patient Position: Sitting, Cuff Size: Adult Large)   Pulse 69   Temp 96.9  F (36.1  C) (Tympanic)   Resp 16   Ht 1.778 m (5' 10\")   Wt 117.9 kg (260 lb)   SpO2 99%   BMI 37.31 kg/m    Body mass index is 37.31 kg/m .  Physical Exam  Constitutional:       General: He is not in acute distress.  Musculoskeletal:      Comments: Left toe has large abrasion covered with liquid band aid. There is moderate tenderness with palpation and painful active ROM. Good capillary refill.     Right hand has several abrasions and moderate swelling and tenderness with palpation around 5th metatarsal. Painful active ROM.    Skin:     Comments: Numerous abrasions on extremities. No signs of infection.    Neurological:      Mental Status: He is alert.            Xray - Reviewed and interpreted by me.  No acute fracture of left foot or right wrist/hand.             "

## 2021-07-12 ENCOUNTER — ANCILLARY PROCEDURE (OUTPATIENT)
Dept: GENERAL RADIOLOGY | Facility: CLINIC | Age: 31
End: 2021-07-12
Attending: PHYSICIAN ASSISTANT
Payer: COMMERCIAL

## 2021-07-12 ENCOUNTER — OFFICE VISIT (OUTPATIENT)
Dept: URGENT CARE | Facility: URGENT CARE | Age: 31
End: 2021-07-12
Payer: COMMERCIAL

## 2021-07-12 VITALS
SYSTOLIC BLOOD PRESSURE: 118 MMHG | HEART RATE: 91 BPM | OXYGEN SATURATION: 99 % | RESPIRATION RATE: 16 BRPM | DIASTOLIC BLOOD PRESSURE: 80 MMHG | BODY MASS INDEX: 36.62 KG/M2 | TEMPERATURE: 97.4 F | HEIGHT: 70 IN | WEIGHT: 255.8 LBS

## 2021-07-12 DIAGNOSIS — S69.91XA HAND INJURY, RIGHT, INITIAL ENCOUNTER: ICD-10-CM

## 2021-07-12 DIAGNOSIS — S69.91XA HAND INJURY, RIGHT, INITIAL ENCOUNTER: Primary | ICD-10-CM

## 2021-07-12 PROCEDURE — 99214 OFFICE O/P EST MOD 30 MIN: CPT | Performed by: PHYSICIAN ASSISTANT

## 2021-07-12 PROCEDURE — 73130 X-RAY EXAM OF HAND: CPT | Mod: RT | Performed by: RADIOLOGY

## 2021-07-12 ASSESSMENT — MIFFLIN-ST. JEOR: SCORE: 2116.55

## 2021-07-12 NOTE — PROGRESS NOTES
"    Assessment & Plan     Hand injury, right, initial encounter  Reassurance, stable x-ray. Continue with rest, ice, and home splint as needed. Avoid aggravating factors but encouraged gentle ROM. Return to clinic if symptoms worsen or do not improve; otherwise follow up as needed    - XR Hand Right G/E 3 Views; Future                 Return in about 1 week (around 7/19/2021), or if symptoms worsen or fail to improve.    Emelia Cagle PA-C  [facility] C[facility] [place]    Subjective   Chief Complaint   Patient presents with     Musculoskeletal Problem     7/3/21 Patient injured right hand was seen here 7/6/21 pain in hand starts at wrist and goes up pinky doesn't have  hard to grab itmes at work.       HPI     MS Injury/Pain    Onset of symptoms was 9 day(s) ago.  Location: right hand  Context:       The injury happened while at home      Mechanism: fall       Patient experienced immediate pain  Course of symptoms is same.    Severity moderate  Current and Associated symptoms: Pain  Aggravating Factors: movement and flexion/extension  Therapies to improve symptoms include: rest.               Review of Systems         Objective    /80 (BP Location: Right arm, Patient Position: Sitting, Cuff Size: Adult Large)   Pulse 91   Temp 97.4  F (36.3  C) (Tympanic)   Resp 16   Ht 1.778 m (5' 10\")   Wt 116 kg (255 lb 12.8 oz)   SpO2 99%   BMI 36.70 kg/m    Body mass index is 36.7 kg/m .  Physical Exam  Constitutional:       General: He is not in acute distress.  Musculoskeletal:      Comments: There is swelling and tenderness with palpation of right 5th metacarpal. Swelling has improved from 1 week ago.    Neurological:      Mental Status: He is alert.            Xray - Reviewed and interpreted by me.  No acute fracture             "

## 2021-07-12 NOTE — LETTER
[facility] URGENT CARE [place]  [address] [address]  Phone: [phone]  Fax: [phone]    July 12, 2021        Hussain Warren  [address] [place] [id number]          To whom it may concern:    RE: Hussian Warren    Patient was seen and treated today at our clinic and missed work 07/12/21 through 07/16/21.    Please contact me for questions or concerns.      Sincerely,        Emelia Cagle PA-C

## 2021-08-18 ENCOUNTER — OFFICE VISIT (OUTPATIENT)
Dept: URGENT CARE | Facility: URGENT CARE | Age: 31
End: 2021-08-18
Payer: COMMERCIAL

## 2021-08-18 VITALS
TEMPERATURE: 97.7 F | HEART RATE: 83 BPM | SYSTOLIC BLOOD PRESSURE: 126 MMHG | OXYGEN SATURATION: 98 % | BODY MASS INDEX: 36.93 KG/M2 | WEIGHT: 257.4 LBS | DIASTOLIC BLOOD PRESSURE: 87 MMHG

## 2021-08-18 DIAGNOSIS — B08.4 HAND, FOOT AND MOUTH DISEASE: Primary | ICD-10-CM

## 2021-08-18 PROCEDURE — 99213 OFFICE O/P EST LOW 20 MIN: CPT | Performed by: NURSE PRACTITIONER

## 2021-08-18 RX ORDER — DIPHENHYDRAMINE HYDROCHLORIDE AND LIDOCAINE HYDROCHLORIDE AND ALUMINUM HYDROXIDE AND MAGNESIUM HYDRO
5-10 KIT EVERY 6 HOURS PRN
Qty: 237 ML | Refills: 0 | Status: SHIPPED | OUTPATIENT
Start: 2021-08-18 | End: 2021-08-30

## 2021-08-18 ASSESSMENT — PAIN SCALES - GENERAL: PAINLEVEL: MODERATE PAIN (4)

## 2021-08-18 NOTE — PROGRESS NOTES
Assessment & Plan   Problem List Items Addressed This Visit     None             15 minutes spent on the date of the encounter doing chart review, history and exam, documentation and further activities per the note       There are no Patient Instructions on file for this visit.    No follow-ups on file.    ANAYA Hawley CNP  [facility]    Abbi Nixon is a 30 year old who presents for the following health issues     HPI     Chief Complaint   Patient presents with     Mouth Lesions     Son was diagnosed with Hand Foot and Mouth last Monday. Today patient has leisons in his mouth and states they are very painful. Has had rash on both hands and feet. Missed work yesterday and today and states he needs Dr's note for work.            Review of Systems   Constitutional, HEENT, cardiovascular, pulmonary, GI, , musculoskeletal, neuro, skin, endocrine and psych systems are negative, except as otherwise noted.      Objective    /87 (BP Location: Left arm, Patient Position: Sitting, Cuff Size: Adult Large)   Pulse 83   Temp 97.7  F (36.5  C) (Tympanic)   Wt 116.8 kg (257 lb 6.4 oz)   SpO2 98%   BMI 36.93 kg/m    Body mass index is 36.93 kg/m .  Physical Exam   GENERAL: healthy, alert and no distress, nontoxic in appearance  EYES: Eyes grossly normal to inspection, PERRL and conjunctivae and sclerae normal  HENT: ear canals and TM's normal, nose and mouth with small ulcers on tip of tongue and under lower lip on chin.  NECK: no adenopathy, supple with full ROM  RESP: lungs clear to auscultation - no rales, rhonchi or wheezes  CV: regular rate and rhythm, normal S1 S2, no S3 or S4, no murmur, click or rub, no peripheral edema   ABDOMEN: soft, nontender  MS: no gross musculoskeletal defects noted, no edema, hands and feet have small sores.    No results found for this or any previous visit (from the past 24 hour(s)).

## 2021-08-18 NOTE — PATIENT INSTRUCTIONS
Increase rest and fluids. Tylenol and/or Ibuprofen for comfort. Cool mist vaporizer. If your symptoms worsen or do not resolve follow up with your primary care provider in 1 week and sooner if needed.        Indications for emergent return to emergency department discussed with patient, who verbalized good understanding and agreement.  Patient understands the limitations of today's evaluation.           Patient Education     When Your Child Has Hand, Foot, and Mouth Disease   Hand, foot, and mouth disease (HFMD) is a common viral infection in children. It can cause mouth sores and a painless rash on the hands, feet, or buttocks. HFMD can be easily spread from 1 person to another. It occurs more often in children younger than 10 years old. But anyone can get it. HFMD is often mistaken for strep throat because the symptoms of both conditions are similar. HFMD can cause some discomfort, but it s not a serious problem. Most cases can easily be managed and treated at home.   What causes hand, foot, and mouth disease?  HFMD is usually caused by the coxsackievirus. It can also be caused by other viruses in the same family as coxsackievirus. Your child may have caught HFMD in 1 of these ways:     Breathing infected air. The virus can enter the air when an infected person coughs, sneezes, or talks.    Contact with contaminated items. Some things may have traces of stool from an infected person. This can occur when an infected person doesn t wash his or her hands after having a bowel movement or changing a diaper.    Contact with fluid from the blisters. The blisters are part of the rash. This type of transmission is rare.  What are the symptoms of hand, foot, and mouth disease?  Symptoms usually appear 24 to 72 hours after contact. They include:     Rash of small, red bumps or blisters on the hands, feet, or buttocks    Mouth sores that often occur on the gums, tongue, inside the cheeks, and in the back of the throat (mouth  sores may not occur in some children)    Sore throat    A rash over the rest of the body    Fever    Loss of appetite    Pain when swallowing    Drooling  How is hand, foot, and mouth disease diagnosed?  HFMD is diagnosed by how the rash and mouth sores look. The healthcare provider will ask about your child s symptoms and health history. He or she will also examine your child. You will be told if any tests are needed. These are done to rule out other infections.   How is hand, foot, and mouth disease treated?  There is no specific treatment for HFMD. But there are things you can do at home to help relieve some symptoms. The illness lasts about 7 to 10 days. Your child is no longer contagious 24 hours after the fever is gone.   Mouth pain    Give your child ibuprofen or acetaminophen to treat pain or discomfort. Or, use the medicine prescribed by the healthcare provider for pain. Talk with your child's provider about the dose and when to give the medicine (schedule). Do not give ibuprofen to a baby age 6 months or younger. Do not give aspirin to a child with a fever. This can put your child at risk of a serious illness called Reye syndrome.    Liquid antacid can be used 4 times per day. This is used to coat the mouth sores for pain relief. Talk with your child's provider about how much and when to give the medicine to your child:  ? Children over age 4 can use 1 teaspoon (5ml) as a mouth rinse after meals.  ? For children under age 4, a parent can place 1/2 teaspoon (2.5ml) in the front of the mouth after meals. Don't use regular mouth rinses. They may sting.  Diet    Follow a soft diet with plenty of fluids to prevent too much fluid loss (dehydration). If your child doesn't want to eat solid foods, it's OK for a few days, as long as he or she drinks plenty of fluids.    Give your child cool drinks and frozen treats such as sherbet. These are soothing and easier to take.    Don't give your child citrus juices such  as orange juice or lemonade. Don't give your child salty or spicy foods. These may cause more pain in the mouth sores.    When to get medical care  Call the child's provider if your child has any of these:     A mouth sore that doesn t go away within  14 days    Increased mouth pain    Trouble swallowing    Neck pain    Chest pain    Trouble breathing    Weakness    Lack of energy    Signs of infection around the rash or mouth sores, such as pus, fluid leaking, or swelling)    Signs of too much fluid loss, such as very dark or little urine, excessive thirst, dry mouth, dizziness    A fever (see Fever and children below)    A seizure  Fever and children  Use a digital thermometer to check your child s temperature. Don t use a mercury thermometer. There are different kinds and uses of digital thermometers. They include:     Rectal. For children younger than 3 years, a rectal temperature is the most accurate.    Forehead (temporal). This works for children age 3 months and older. If a child under 3 months old has signs of illness, this can be used for a first pass. The provider may want to confirm with a rectal temperature.    Ear (tympanic). Ear temperatures are accurate after 6 months of age, but not before.    Armpit (axillary). This is the least reliable but may be used for a first pass to check a child of any age with signs of illness. The provider may want to confirm with a rectal temperature.    Mouth (oral). Don t use a thermometer in your child s mouth until he or she is at least 4 years old.  Use the rectal thermometer with care. Follow the product maker s directions for correct use. Insert it gently. Label it and make sure it s not used in the mouth. It may pass on germs from the stool. If you don t feel OK using a rectal thermometer, ask the healthcare provider what type to use instead. When you talk with any healthcare provider about your child s fever, tell him or her which type you used.   Below are  guidelines to know if your young child has a fever. Your child s healthcare provider may give you different numbers for your child. Follow your provider s specific instructions.   Fever readings for a baby under 3 months old:     First, ask your child s healthcare provider how you should take the temperature.    Rectal or forehead: 100.4 F (38 C) or higher    Armpit: 99 F (37.2 C) or higher  Fever readings for a child age 3 months to 36 months (3 years):     Rectal, forehead, or ear: 102 F (38.9 C) or higher    Armpit: 101 F (38.3 C) or higher  Call the healthcare provider in these cases:     Repeated temperature of 104 F (40 C) or higher in a child of any age    Fever of 100.4 or higher in baby younger than 3 months    Fever that lasts more than 24 hours in a child under age 2  Fever that lasts for 3 days in a child age 2 or older  How can hand, foot, and mouth disease be prevented?  Follow these steps to keep your child from passing HFMD on to others:     Teach your child to wash his or her hands with soap and warm water often. Handwashing is very important before eating or handling food, after using the bathroom, and after touching the rash. A child is very contagious during the first week of the illness. He or she can still be contagious for days to weeks after the illness goes away.    Your child should stay home while he or she is sick. Ask your child's healthcare provider how long your child should avoid school, , and playing with others.    Don't let your child share cups, utensils, or napkins. Don't let them share personal items such as towels and toothbrushes.  [employer] last reviewed this educational content on 4/1/2020 2000-2021 [employer]. All rights reserved. This information is not intended as a substitute for professional medical care. Always follow your healthcare professional's instructions.           Patient Education     Hand, Foot, and Mouth Disease (Child)    Hand, foot,  and mouth disease (HFMD) is an illness caused by a virus. It's usually seen in young children. This virus causes small sores in the throat, lips, cheeks, gums, and tongue. Small blisters or red spots may also appear on the palms (hands), diaper area, and soles of the feet. The child usually has a low-grade fever and poor appetite. HFMD is not a serious illness and usually goes away in 1 to 2 weeks. The painful sores in the mouth may prevent your child from eating and drinking.   It takes 3 to 5 days for the illness to appear in an exposed child. Generally, HFMD is most contagious during the first week of the illness. Sometimes children can be contagious for days or weeks after the symptoms have disappeared.   HFMD can be passed from person to person by:     Touching your nose, mouth, or eye after touching the stool of a person who has the virus    Touching your nose, mouth, or eye after touching fluid from the blisters or sores of an infected person    Respiratory droplets from sneezing, coughing, or blowing your nose    Touching contaminated objects such as toys or doorknobs    Oral droplets from kissing  To prevent the spread of the virus, be sure to wash your hands with soap and water for at least 20 seconds. Or use an alcohol-based hand  if no soap and water are available. Always wash your hand before and after taking care of someone who is sick, before, during, and after preparing food, before eating, after using the toilet, after changing diapers, after sneezing or coughing, and after blowing your nose. Help children to learn how to correctly wash their hands.   Home care  Mouth pain  Unless your child's healthcare provider has prescribed another medicine for mouth pain:     You can give acetaminophen or ibuprofen to ease pain, discomfort, or fever. But talk with the provider before giving your child either of these medicines. Ask about how much to give and how often. Don't give ibuprofen to a baby  6 months of age or younger. Also talk with your child's provider before giving these medicines if your child has chronic liver or kidney disease or ever had a stomach ulcer or gastrointestinal bleeding. Never give aspirin to anyone under 18 years of age who has a fever. It may cause Reye syndrome or death.    You can give liquid rinses to a child older than 12 months of age. Ask your child's healthcare provider for instructions.  Feeding  Follow a soft diet with plenty of fluids to prevent dehydration. If your child doesn't want to eat solid foods, it's OK for a few days, as long as they drink lots of fluid. Cool drinks and frozen treats such as sherbet are soothing and easier to take. Don't give your child citrus juices such as orange juice or lemonade, or salty or spicy foods. These may cause more pain in the mouth sores.   Return to  or school  Children may usually return to  or school once the fever is gone and they are eating and drinking well. Contact your healthcare provider and ask when your child is able to return to  or school.   Follow up  Follow up with your child's healthcare provider, or as advised.  When to seek medical advice  Call your child's healthcare provider right away if any of these occur:    Your child complains of pain in the back of the neck, or is difficult to arouse    Your child has a severe headache or continued vomiting    Your child is having trouble breathing    Your child is drowsy or has trouble staying awake    Your child is having trouble swallowing    Your child still has mouth sores after 2 weeks    Your child's symptoms are getting worse    Your child appears to be dehydrated. Signs are dry mouth, no tears, and no pee 8 or more hours.    Your child has a fever (see Fever and children, below)  Call 911  Call 911 if any of these occur:     Unusual fussiness, drowsiness, or confusion    Severe headache or vomiting that continues    Trouble  breathing    Seizures  Fever and children  Use a digital thermometer to check your child s temperature. Don t use a mercury thermometer. There are different kinds and uses of digital thermometers. They include:     Rectal. For children younger than 3 years, a rectal temperature is the most accurate.    Forehead (temporal). This works for children age 3 months and older. If a child under 3 months old has signs of illness, this can be used for a first pass. The provider may want to confirm with a rectal temperature.    Ear (tympanic). Ear temperatures are accurate after 6 months of age, but not before.    Armpit (axillary). This is the least reliable but may be used for a first pass to check a child of any age with signs of illness. The provider may want to confirm with a rectal temperature.    Mouth (oral). Don t use a thermometer in your child s mouth until he or she is at least 4 years old.  Use the rectal thermometer with care. Follow the product maker s directions for correct use. Insert it gently. Label it and make sure it s not used in the mouth. It may pass on germs from the stool. If you don t feel OK using a rectal thermometer, ask the healthcare provider what type to use instead. When you talk with any healthcare provider about your child s fever, tell him or her which type you used.   Below are guidelines to know if your young child has a fever. Your child s healthcare provider may give you different numbers for your child. Follow your provider s specific instructions.   Fever readings for a baby under 3 months old:     First, ask your child s healthcare provider how you should take the temperature.    Rectal or forehead: 100.4 F (38 C) or higher    Armpit: 99 F (37.2 C) or higher  Fever readings for a child age 3 months to 36 months (3 years):     Rectal, forehead, or ear: 102 F (38.9 C) or higher    Armpit: 101 F (38.3 C) or higher  Call the healthcare provider in these cases:     Repeated temperature of  104 F (40 C) or higher in a child of any age    Fever of 100.4  F (38  C) or higher in baby younger than 3 months    Fever that lasts more than 24 hours in a child under age 2    Fever that lasts for 3 days in a child age 2 or older  [employer] last reviewed this educational content on 7/1/2020 2000-2021 [employer]. All rights reserved. This information is not intended as a substitute for professional medical care. Always follow your healthcare professional's instructions.

## 2021-08-18 NOTE — LETTER
August 18, 2021      Hussain Warren  [address] [address]        To Whom It May Concern:    Hussain Warren was seen in our clinic. Please release from missed work this week due to illness and possibly for the next 1-2 days as well.      Sincerely,        ANAYA Hawley CNP

## 2021-08-25 ENCOUNTER — VIRTUAL VISIT (OUTPATIENT)
Dept: FAMILY MEDICINE | Facility: OTHER | Age: 31
End: 2021-08-25
Payer: COMMERCIAL

## 2021-08-25 DIAGNOSIS — B08.4 HAND, FOOT AND MOUTH DISEASE: Primary | ICD-10-CM

## 2021-08-25 PROCEDURE — 99212 OFFICE O/P EST SF 10 MIN: CPT | Mod: 95 | Performed by: NURSE PRACTITIONER

## 2021-08-25 NOTE — PROGRESS NOTES
Hussain is a 30 year old who is being evaluated via a billable video visit.      How would you like to obtain your AVS? MyChart  If the video visit is dropped, the invitation should be resent by: Text to cell phone: [phone]   Will anyone else be joining your video visit? No      Assessment & Plan     Hand, foot and mouth disease  Patient with improvement of symptoms  Discussed as long as he has been fever free for 24 hours and no open lesions he can return to work  Note completed today.            The patient indicates understanding of these issues and agrees with the plan.    There are no Patient Instructions on file for this visit.    No follow-ups on file.    ANAYA Dee [facility]   Hussain is a 30 year old who presents for the following health issues     HPI     Patient states he needs a note to go back to work. Patient and child had hand foot mouth. Patient states that he does have some area on his hands. Patient states was diagnosed last Tuesday 8/17/2021.     Patient states he is doing better. He has some spots on the hand. These are crusted over and dried out.   Work is worried about contagiousness of it.       Review of Systems         Objective           Vitals:  No vitals were obtained today due to virtual visit.    Physical Exam   GENERAL: Healthy, alert and no distress    Diagnostic: None           Telephone visit: 7 minutes

## 2021-08-25 NOTE — LETTER
[facility]  [address]  [place] [address]  Phone: [phone]    August 25, 2021        Hussain Warren  [address] [place] [id number]          To whom it may concern:    RE: Hussain Warren    Patient was seen and treated today at our clinic and missed work. He is allowed to return to work as long as he is fever free for 24 hours and no new lesions.     Please contact me for questions or concerns.      Sincerely,        ANAYA Dee CNP

## 2021-08-30 ENCOUNTER — VIRTUAL VISIT (OUTPATIENT)
Dept: FAMILY MEDICINE | Facility: CLINIC | Age: 31
End: 2021-08-30
Payer: COMMERCIAL

## 2021-08-30 DIAGNOSIS — G44.229 CHRONIC TENSION-TYPE HEADACHE, NOT INTRACTABLE: Primary | ICD-10-CM

## 2021-08-30 DIAGNOSIS — F31.9 BIPOLAR 1 DISORDER (H): ICD-10-CM

## 2021-08-30 DIAGNOSIS — L70.0 CYSTIC ACNE: ICD-10-CM

## 2021-08-30 PROCEDURE — 99214 OFFICE O/P EST MOD 30 MIN: CPT | Mod: 95 | Performed by: NURSE PRACTITIONER

## 2021-08-30 RX ORDER — DOXYCYCLINE 100 MG/1
100 CAPSULE ORAL DAILY
Qty: 30 CAPSULE | Refills: 3 | Status: SHIPPED | OUTPATIENT
Start: 2021-08-30 | End: 2022-03-23

## 2021-08-30 RX ORDER — TIZANIDINE 2 MG/1
2-4 TABLET ORAL 3 TIMES DAILY PRN
Qty: 30 TABLET | Refills: 3 | Status: SHIPPED | OUTPATIENT
Start: 2021-08-30 | End: 2022-03-23

## 2021-08-30 NOTE — PROGRESS NOTES
"Hussain is a 30 year old who is being evaluated via a billable video visit.      How would you like to obtain your AVS? MyChart  If the video visit is dropped, the invitation should be resent by: Text to cell phone: [phone]  Will anyone else be joining your video visit? No    Video Start Time: 10:55 AM    Assessment & Plan     Chronic tension-type headache, not intractable  No acute distress. With ongoing symptoms recommend doing a trial of amitriptyline, risks and benefits of medication discussed.  Tizanidine as needed for symptoms.  Consider PT, massage or chiropractic care.   - amitriptyline (ELAVIL) 25 MG tablet; Take 1/2 tab for 1 week then increase to 25 mg daily  - tiZANidine (ZANAFLEX) 2 MG tablet; Take 1-2 tablets (2-4 mg) by mouth 3 times daily as needed for muscle spasms    Cystic acne  Doxycycline sent to the pharmacy for significant symptoms.  Risks and benefits of medication discussed. Dermatology referral placed for consideration of Accutane.    - doxycycline monohydrate (MONODOX) 100 MG capsule; Take 1 capsule (100 mg) by mouth daily  - Adult Dermatology Referral; Future    Bipolar 1 disorder (H)  Reports no symptoms, moods good    Return in about 4 weeks (around 9/27/2021), or if symptoms worsen or fail to improve.    ANAYA [facility]    Subjective   Hussain is a 30 year old who presents for the following health issues     HPI     Headache  Onset/Duration: \"long time\"   Description  Location: neck to temple/ eyes    Character: throbbing pain  Frequency: daily or every other day over the last 2 months   Duration:  Can last all day   Wake with headaches: YES  Able to do daily activities when headache present: YES- depends on how bad   Intensity:  moderate  Progression of Symptoms:  worsening  Accompanying signs and symptoms:  Stiff neck: YES  Neck or upper back pain: YES- neck   Sinus or URI symptoms no   Fever: no  Nausea or vomiting: YES  Dizziness: " no  Numbness/tingling: no  Weakness: no  Visual changes: none  History  Head trauma: no - played football and wrestling. Was in    Family history of migraines: YES  Daily pain medication use: no  Previous tests for headaches: no  Neurologist evaluation: no  Precipitating or Alleviating factors (light/sound/sleep/caffeine): sound and light make it worse.   Therapies tried and outcome: Ibuprofen (Advil, Motrin) , muscle relaxer           Outcome - helpful this AM     Calmed down for a while but now worsened again for the past 2 months  Showers help  Lasting 3-4 hours sometimes all day  Chiropractor for a little while-back felt better but headaches    Acne   Onset/Duration: Worse recently   Description  Location: face, back, buttock   Character: round, raised  Itching: no  Intensity:  moderate  Progression of Symptoms:  worsening  History:           Previous episodes of similar rash: None  New exposures:  None  Recent travel: no  Exposure to similar rash: no  Therapies tried and outcome: washing      Review of Systems   Constitutional, HEENT, cardiovascular, pulmonary, gi and gu systems are negative, except as otherwise noted.      Objective           Vitals:  No vitals were obtained today due to virtual visit.    Physical Exam   GENERAL: Healthy, alert and no distress  EYES: Eyes grossly normal to inspection.  No discharge or erythema, or obvious scleral/conjunctival abnormalities.  RESP: No audible wheeze, cough, or visible cyanosis.  No visible retractions or increased work of breathing.    SKIN: Visible skin clear. No significant rash, abnormal pigmentation or lesions.  NEURO: Cranial nerves grossly intact.  Mentation and speech appropriate for age.  PSYCH: Mentation appears normal, affect normal/bright, judgement and insight intact, normal speech and appearance well-groomed.          Video-Visit Details    Type of service:  Video Visit    Video End Time:11:15 AM    Originating Location (pt. Location):  Home    Distant Location (provider location):  [facility]     Platform used for Video Visit: Wendi

## 2021-08-30 NOTE — PATIENT INSTRUCTIONS
Dermatology referral placed    Doxycyline 100 mg daily for the acne    Amitriptyline 1/2 tab for 1-2 weeks then increase to 1 tab daily    Tizanidine as needed for muscle relaxant    Follow up if symptoms do not improve or worsen.

## 2021-09-18 ENCOUNTER — HEALTH MAINTENANCE LETTER (OUTPATIENT)
Age: 31
End: 2021-09-18

## 2021-10-04 ENCOUNTER — ANCILLARY PROCEDURE (OUTPATIENT)
Dept: GENERAL RADIOLOGY | Facility: CLINIC | Age: 31
End: 2021-10-04
Attending: EMERGENCY MEDICINE
Payer: COMMERCIAL

## 2021-10-04 ENCOUNTER — OFFICE VISIT (OUTPATIENT)
Dept: URGENT CARE | Facility: URGENT CARE | Age: 31
End: 2021-10-04
Payer: COMMERCIAL

## 2021-10-04 VITALS
SYSTOLIC BLOOD PRESSURE: 138 MMHG | BODY MASS INDEX: 37.34 KG/M2 | DIASTOLIC BLOOD PRESSURE: 62 MMHG | WEIGHT: 260.8 LBS | HEART RATE: 97 BPM | TEMPERATURE: 98.4 F | RESPIRATION RATE: 16 BRPM | OXYGEN SATURATION: 96 % | HEIGHT: 70 IN

## 2021-10-04 DIAGNOSIS — S90.31XA CONTUSION OF RIGHT HEEL, INITIAL ENCOUNTER: ICD-10-CM

## 2021-10-04 DIAGNOSIS — T14.90XA TRAUMA: Primary | ICD-10-CM

## 2021-10-04 PROCEDURE — 99213 OFFICE O/P EST LOW 20 MIN: CPT | Performed by: EMERGENCY MEDICINE

## 2021-10-04 PROCEDURE — 73650 X-RAY EXAM OF HEEL: CPT | Mod: RT | Performed by: RADIOLOGY

## 2021-10-04 ASSESSMENT — PAIN SCALES - GENERAL: PAINLEVEL: WORST PAIN (10)

## 2021-10-04 ASSESSMENT — MIFFLIN-ST. JEOR: SCORE: 2139.23

## 2021-10-04 NOTE — PATIENT INSTRUCTIONS
Ice pack plus elevation for next 24 hours    Tylenol and ibuprofen as needed for pain    Minimal walking for 1 week.    No work with quiet activity at home for 1 week.    Follow-up with orthopedics at 1 week if pain persists.  Patient Education     Lower Extremity Bruise  You have a bruise (contusion on a leg, knee, ankle, foot, or toe. Symptoms include pain, swelling, and skin discoloration. No bones are broken. This injury may take from a few days to a few weeks to heal.  During that time, the bruise may change from reddish in color, to purple-blue, to green-yellow, to yellow-brown.   Home care  Unless another medicine was prescribed, you can take acetaminophen, ibuprofen, or naproxen to control pain. Talk with your healthcare provider before using these medicines if you have chronic liver or kidney disease or ever had a stomach ulcer or digestive bleeding.  Elevate the injured area to reduce pain and swelling. As much as possible, sit or lie down with the injured area raised about the level of your heart. This is especially important during the first 48 hours.  Ice the injured area to help reduce pain and swelling. Wrap an ice pack or ice cubes in a plastic bag in a thin towel. Apply to the bruised area for 20 minutes every 1 to 2 hours the first day. Continue this 3 to 4 times a day until the pain and swelling goes away.  If crutches have been advised, don't bear full weight on the injured leg until you can do so without pain. You may return to sports when you are able to put full weight and impact on the injured leg without pain.    Follow up  Follow up with your healthcare provider, or as advised. Call if you are not improving within the next 1 to 2 weeks.   When to seek medical advice   Call your healthcare provider right away if any of these occur:  Increased pain or swelling  Foot or toes become cold, blue, numb or tingly  Signs of infection: Warmth, drainage, or increased redness or pain around the  injury  Inability to move the injured area, or any joints below the injured area  Frequent bruising for unknown reasons  [employer] last reviewed this educational content on 8/1/2019 2000-2021 [employer]. All rights reserved. This information is not intended as a substitute for professional medical care. Always follow your healthcare professional's instructions.

## 2021-10-05 NOTE — PROGRESS NOTES
CHIEF COMPLAINT: Injury right heel.      HPI: Patient is a 30-year-old male who yesterday was dealing with a crockpot falling and he struck the back side of his right heel.  He had only minimal discomfort at the time but awoke today with severe pain localized to the posterior heel region.  He has marked pain with weightbearing.  No wounds.  No other injuries or complaints.      ROS: See HPI otherwise normal.    No Known Allergies   Current Outpatient Medications   Medication Sig Dispense Refill     amitriptyline (ELAVIL) 25 MG tablet Take 1/2 tab for 1 week then increase to 25 mg daily 30 tablet 5     Ascorbic Acid (VITAMIN C) 500 MG CAPS Take 2 capsules by mouth daily       doxycycline monohydrate (MONODOX) 100 MG capsule Take 1 capsule (100 mg) by mouth daily 30 capsule 3     tiZANidine (ZANAFLEX) 2 MG tablet Take 1-2 tablets (2-4 mg) by mouth 3 times daily as needed for muscle spasms 30 tablet 3     vitamin D3 (CHOLECALCIFEROL) 50 mcg (2000 units) tablet Take 1 tablet by mouth daily           PE: Physical exam reveals a 30-year-old male be in no acute distress.  Is afebrile.  He does appear uncomfortable with any weightbearing on the right foot.  Palpation of his heel reveals fairly exquisite tenderness on the posterior aspect of the heel.  There is mild swelling there.  No redness or signs of infection.  No open wounds.  Gastroc tendon is intact.  Negative Spaulding test.  Sensorimotor exam are intact distally.        TREATMENT: X-ray of the right heel is negative.  Postop boot applied.  Orthopedic referral placed.      ASSESSMENT: Severe contusion right heel, rule out occult fracture based on level of pain.      DIAGNOSIS: Severe contusion right heel.      PLAN: We are boot for 1 week with quiet activity.  Tylenol and/or ibuprofen for pain.  Recheck 1 week through orthopedics if pain persists.

## 2022-03-23 ENCOUNTER — OFFICE VISIT (OUTPATIENT)
Dept: URGENT CARE | Facility: URGENT CARE | Age: 32
End: 2022-03-23
Payer: COMMERCIAL

## 2022-03-23 VITALS
RESPIRATION RATE: 18 BRPM | WEIGHT: 256 LBS | DIASTOLIC BLOOD PRESSURE: 76 MMHG | HEART RATE: 110 BPM | OXYGEN SATURATION: 100 % | SYSTOLIC BLOOD PRESSURE: 132 MMHG | TEMPERATURE: 97.6 F | BODY MASS INDEX: 36.73 KG/M2

## 2022-03-23 DIAGNOSIS — J06.9 VIRAL UPPER RESPIRATORY TRACT INFECTION: Primary | ICD-10-CM

## 2022-03-23 PROCEDURE — 99213 OFFICE O/P EST LOW 20 MIN: CPT | Performed by: STUDENT IN AN ORGANIZED HEALTH CARE EDUCATION/TRAINING PROGRAM

## 2022-03-23 RX ORDER — BENZONATATE 200 MG/1
200 CAPSULE ORAL 3 TIMES DAILY PRN
Qty: 30 CAPSULE | Refills: 0 | Status: SHIPPED | OUTPATIENT
Start: 2022-03-23 | End: 2024-01-20

## 2022-03-23 NOTE — PROGRESS NOTES
Assessment & Plan     Viral upper respiratory tract infection  Patient has been having congestion coughing, sore throat and nasal congestion for 3 days. He states that at his work everyone has the same symptoms. He is most concerned with the cough which is keeping him from sleeping at night. On assessment lungs are clear, throat is negative for erythema and exudates and swelling, Ears are negative for swelling and tympanic membranes are gray and translucent.  - benzonatate (TESSALON) 200 MG capsule  Dispense: 30 capsule; Refill: 0    Patient has a viral upper respiratory illness, he declined strep and covid swabs today. Discussed that viral illness can last 10-14 days and he should increase fluids and rest, use OTC NSAID'S for fever and pain relief. He can start taking benzonate 200mg for cough relief as needed 3 times day. He should take the pills whole with a full glass of water. Educated on when he should RTC if symptoms do not improve. Patient is in agreement with this plan.      Physician Attestation   I, ANAYA Wolfe CNP, saw this patient and agree with the findings and plan of care as documented in the note by Yaz Jolley, LIA student.    No follow-ups on file.    ANAYA Wolfe CNP  [facility]    Abbi Nixon is a 31 year old male who presents to clinic today for the following health issues:  Chief Complaint   Patient presents with     Pharyngitis     st, coughing, sinus pressure, congestion and headaches      Sinus Problem     sinus pressure since 4 days      HPI    Patient states having cold like symptoms for 3 days, he states his friends at work have all had colds over the last week and he knew he would get it at some point. He is most concerned about the cough when he lays down at night because it keeps him up and says he was only able to get 1 hour of sleep last night.  URI Adult    Onset of symptoms was 3 day(s) ago.  Course of illness is  same.    Severity mild  Current and Associated symptoms: cough - productive, sore throat and fatigue  Treatment measures tried include None tried.  Predisposing factors include ill contact: Work.        Review of Systems  Constitutional, HEENT, cardiovascular, pulmonary, gi and gu systems are negative, except as otherwise noted.      Objective    /76   Pulse 110   Temp 97.6  F (36.4  C) (Tympanic)   Resp 18   Wt 116.1 kg (256 lb)   SpO2 100%   BMI 36.73 kg/m    Physical Exam   GENERAL: healthy, alert and no distress  HENT: ear canals and TM's normal, nose and mouth without ulcers or lesions  NECK: no adenopathy, no asymmetry, masses, or scars and thyroid normal to palpation  RESP: lungs clear to auscultation - no rales, rhonchi or wheezes  CV: regular rate and rhythm, normal S1 S2, no S3 or S4, no murmur, click or rub, no peripheral edema and peripheral pulses strong  ABDOMEN: soft, nontender, no hepatosplenomegaly, no masses and bowel sounds normal  MS: no gross musculoskeletal defects noted, no edema

## 2022-03-26 ENCOUNTER — OFFICE VISIT (OUTPATIENT)
Dept: URGENT CARE | Facility: URGENT CARE | Age: 32
End: 2022-03-26
Payer: COMMERCIAL

## 2022-03-26 VITALS
HEART RATE: 100 BPM | BODY MASS INDEX: 36.73 KG/M2 | WEIGHT: 256 LBS | OXYGEN SATURATION: 98 % | SYSTOLIC BLOOD PRESSURE: 131 MMHG | DIASTOLIC BLOOD PRESSURE: 80 MMHG | TEMPERATURE: 98.4 F

## 2022-03-26 DIAGNOSIS — R07.0 THROAT PAIN: Primary | ICD-10-CM

## 2022-03-26 DIAGNOSIS — J02.9 SORE THROAT: ICD-10-CM

## 2022-03-26 LAB
DEPRECATED S PYO AG THROAT QL EIA: NEGATIVE
GROUP A STREP BY PCR: NOT DETECTED

## 2022-03-26 PROCEDURE — 87651 STREP A DNA AMP PROBE: CPT | Performed by: EMERGENCY MEDICINE

## 2022-03-26 PROCEDURE — 99213 OFFICE O/P EST LOW 20 MIN: CPT | Performed by: EMERGENCY MEDICINE

## 2022-03-26 RX ORDER — CEFDINIR 300 MG/1
300 CAPSULE ORAL 2 TIMES DAILY
Qty: 20 CAPSULE | Refills: 0 | Status: SHIPPED | OUTPATIENT
Start: 2022-03-26 | End: 2022-04-05

## 2022-03-26 RX ORDER — PREDNISONE 50 MG/1
50 TABLET ORAL DAILY
Qty: 3 TABLET | Refills: 0 | Status: SHIPPED | OUTPATIENT
Start: 2022-03-26 | End: 2022-03-29

## 2022-03-26 NOTE — PROGRESS NOTES
Follow-up fingers CHIEF COMPLAINT: Sore throat.      HPI: Patient is a 31-year-old male whose had a URI for about 10 days.  Most symptoms have improved except his throat is now getting worse.  He noted marked pain throughout the night.  No difficulty breathing.  OTC pain medicine slightly helpful only.      ROS: See HPI otherwise normal.    No Known Allergies   Current Outpatient Medications   Medication Sig Dispense Refill     Ascorbic Acid (VITAMIN C) 500 MG CAPS Take 2 capsules by mouth daily       cefdinir (OMNICEF) 300 MG capsule Take 1 capsule (300 mg) by mouth 2 times daily for 10 days 20 capsule 0     predniSONE (DELTASONE) 50 MG tablet Take 1 tablet (50 mg) by mouth daily for 3 days 3 tablet 0     vitamin D3 (CHOLECALCIFEROL) 50 mcg (2000 units) tablet Take 1 tablet by mouth daily       benzonatate (TESSALON) 200 MG capsule Take 1 capsule (200 mg) by mouth 3 times daily as needed for cough (Patient not taking: Reported on 3/26/2022) 30 capsule 0         PE: No acute distress on exam.  Vital signs were reviewed and are normal-see chart.  No dysphonia.  Nondyspneic appearing.  HEENT reveals moist oral mucous membranes.  Posterior pharynx is erythematous.  Uvula slightly edematous.  No abscess seen.  Ears reveal normal TMs no signs of infection.  Neck reveals no adenopathy.  Lungs are clear throughout.  Heart is regular.  Skin warm and moist.        TREATMENT: Rapid strep: Negative      ASSESSMENT: Protracted sore throat, worsening after a week to 10 days of viral URI.  Decision made empirically treat.      DIAGNOSIS: Pharyngitis.      PLAN: Cefdinir prednisone as instructed.  Recheck if worse.  Recheck 1 week if symptoms persist.

## 2022-03-26 NOTE — PATIENT INSTRUCTIONS
Ice chips, popsicles, lozenges for sore throat.    Tylenol or ibuprofen may be helpful.    Antibiotics and prednisone as instructed.    Plenty of fluids.    Recheck if any worsening symptoms or no improvement in 4 5 days.  Patient Education     Self-Care for Sore Throats     Sore throats happen for many reasons, such as colds, allergies, cigarette smoke, air pollution, and infections caused by viruses or bacteria. In any case, your throat becomes red and sore. Your goal for self-care is to reduce your discomfort while giving your throat a chance to heal.  Moisten and soothe your throat  Tips include the following:  Try a sip of water first thing after waking up.  Keep your throat moist by drinking 6 or more glasses of clear liquids every day.  Run a cool-air humidifier in your room overnight.  Stay away from cigarette smoke.   Check the air quality index,if air pollution gives you a sore throat. On high pollution days, try to limit outdoor time.  Suck on throat lozenges, cough drops, hard candy, ice chips, or frozen fruit-juice bars. Use the sugar-free versions if your diet or medical condition requires them.  Gargle to ease irritation  Gargling every hour or 2 can ease irritation. Try gargling with 1 of these solutions:  1/4 teaspoon of salt in 1/2 cup of warm water  An over-the-counter anesthetic gargle  Use medicine for more relief  Over-the-counter medicine can reduce sore throat symptoms. Ask your pharmacist if you have questions about which medicine to use. To prevent possible medicine interactions, let the pharmacist know what medicines you take. To decrease symptoms:  Ease pain with anesthetic sprays. Aspirin or an aspirin substitute also helps. Remember, never give aspirin to anyone 18 or younger. Don't take aspirin if you are already taking blood thinners.   For sore throats caused by allergies, try antihistamines to block the allergic reaction.  Unless a sore throat is caused by a bacterial infection,  antibiotics won t help you.  Prevent future sore throats  Prevention tips include:  Stop smoking or reduce contact with secondhand smoke. Smoke irritates the tender throat lining.  Limit contact with pets and with allergy-causing substances, such as pollen and mold.  Wash your hands often when you re around someone with a sore throat or cold. This will keep viruses or bacteria from spreading.  Limit outdoor time when air pollution is bad.  Don t strain your vocal cords.  When to call your healthcare provider  Contact your healthcare provider if you have:  Fever of 100.4 F (38.0 C) or higher, or as directed by your healthcare provider  White spots on the throat  Great Trouble swallowing  A skin rash  Recent exposure to someone else with strep bacteria  Severe hoarseness and swollen glands in the neck or jaw  Call 911  Call 911 if any of the following occur:  Trouble breathing or catching your breath  Drooling and problems swallowing  Wheezing  Unable to talk  Feeling dizzy or faint  Feeling of doom  [employer] last reviewed this educational content on 9/1/2019 2000-2021 [employer]. All rights reserved. This information is not intended as a substitute for professional medical care. Always follow your healthcare professional's instructions.

## 2022-05-31 ENCOUNTER — OFFICE VISIT (OUTPATIENT)
Dept: URGENT CARE | Facility: URGENT CARE | Age: 32
End: 2022-05-31
Payer: COMMERCIAL

## 2022-05-31 VITALS
TEMPERATURE: 98.1 F | DIASTOLIC BLOOD PRESSURE: 88 MMHG | BODY MASS INDEX: 35.15 KG/M2 | WEIGHT: 245 LBS | OXYGEN SATURATION: 98 % | SYSTOLIC BLOOD PRESSURE: 134 MMHG | HEART RATE: 84 BPM

## 2022-05-31 DIAGNOSIS — R30.0 DYSURIA: Primary | ICD-10-CM

## 2022-05-31 LAB
ALBUMIN UR-MCNC: NEGATIVE MG/DL
APPEARANCE UR: CLEAR
BILIRUB UR QL STRIP: NEGATIVE
COLOR UR AUTO: YELLOW
GLUCOSE UR STRIP-MCNC: NEGATIVE MG/DL
HGB UR QL STRIP: NEGATIVE
KETONES UR STRIP-MCNC: NEGATIVE MG/DL
LEUKOCYTE ESTERASE UR QL STRIP: NEGATIVE
NITRATE UR QL: NEGATIVE
PH UR STRIP: 5.5 [PH] (ref 5–7)
SP GR UR STRIP: 1.01 (ref 1–1.03)
UROBILINOGEN UR STRIP-ACNC: 0.2 E.U./DL

## 2022-05-31 PROCEDURE — 99213 OFFICE O/P EST LOW 20 MIN: CPT | Performed by: PHYSICIAN ASSISTANT

## 2022-05-31 PROCEDURE — 87491 CHLMYD TRACH DNA AMP PROBE: CPT | Performed by: PHYSICIAN ASSISTANT

## 2022-05-31 PROCEDURE — 81003 URINALYSIS AUTO W/O SCOPE: CPT | Performed by: PHYSICIAN ASSISTANT

## 2022-05-31 PROCEDURE — 87591 N.GONORRHOEAE DNA AMP PROB: CPT | Performed by: PHYSICIAN ASSISTANT

## 2022-05-31 RX ORDER — CIPROFLOXACIN 500 MG/1
500 TABLET, FILM COATED ORAL 2 TIMES DAILY
Qty: 14 TABLET | Refills: 0 | Status: SHIPPED | OUTPATIENT
Start: 2022-05-31 | End: 2022-06-07

## 2022-05-31 NOTE — PROGRESS NOTES
"  Assessment & Plan     Dysuria    - UA macro with reflex to Microscopic and Culture - Clinc Collect  - Neisseria gonorrhoeae PCR  - Chlamydia trachomatis PCR - Clinic Collect  - ciprofloxacin (CIPRO) 500 MG tablet; Take 1 tablet (500 mg) by mouth 2 times daily for 7 days             Tobacco Cessation:   reports that he has been smoking cigarettes. He has been smoking about 0.40 packs per day. He has never used smokeless tobacco.      BMI:   Estimated body mass index is 35.15 kg/m  as calculated from the following:    Height as of 10/4/21: 1.778 m (5' 10\").    Weight as of this encounter: 111.1 kg (245 lb).           Return in about 1 week (around 6/7/2022), or if symptoms worsen or fail to improve.    KALI Prajapati [facility]            Subjective   Chief Complaint   Patient presents with     Urinary Problem     When he urinates says it feels like razor blades, frequency, urgency and doesn't seem to get much to come out.         HPI     UTI    Onset of symptoms was 1day(s).  Course of illness is worsening   Severity moderate  Current and associated symptoms dysuria and frequency  Treatment and measures tried None  Predisposing factors include none  Patient denies rigors, flank pain and temperature > 101 degrees F.    No new exposures,  same partner,               Review of Systems   Constitutional, HEENT, cardiovascular, pulmonary, gi and gu systems are negative, except as otherwise noted.      Objective    /88   Pulse 84   Temp 98.1  F (36.7  C) (Tympanic)   Wt 111.1 kg (245 lb)   SpO2 98%   BMI 35.15 kg/m    Body mass index is 35.15 kg/m .  Physical Exam  Constitutional:       General: He is not in acute distress.  Genitourinary:     Comments: Exam declined  Neurological:      Mental Status: He is alert.                        "

## 2022-06-01 LAB
C TRACH DNA SPEC QL NAA+PROBE: NEGATIVE
N GONORRHOEA DNA SPEC QL NAA+PROBE: NEGATIVE

## 2022-06-01 NOTE — RESULT ENCOUNTER NOTE
Final result for both N. Gonorrhoeae PCR and Chlamydia Trachomatis PCR are NEGATIVE.  No treatment or change in treatment per [facility] ED Lab Result N. Gonorrhea AND/OR Chlamydia T. protocol.

## 2022-06-19 ENCOUNTER — HEALTH MAINTENANCE LETTER (OUTPATIENT)
Age: 32
End: 2022-06-19

## 2022-09-26 ENCOUNTER — ANCILLARY PROCEDURE (OUTPATIENT)
Dept: GENERAL RADIOLOGY | Facility: CLINIC | Age: 32
End: 2022-09-26
Attending: PHYSICIAN ASSISTANT
Payer: COMMERCIAL

## 2022-09-26 ENCOUNTER — OFFICE VISIT (OUTPATIENT)
Dept: URGENT CARE | Facility: URGENT CARE | Age: 32
End: 2022-09-26
Payer: COMMERCIAL

## 2022-09-26 VITALS
OXYGEN SATURATION: 100 % | SYSTOLIC BLOOD PRESSURE: 132 MMHG | BODY MASS INDEX: 36.88 KG/M2 | TEMPERATURE: 98.3 F | DIASTOLIC BLOOD PRESSURE: 79 MMHG | WEIGHT: 257 LBS | HEART RATE: 89 BPM

## 2022-09-26 DIAGNOSIS — R10.31 ABDOMINAL PAIN, RIGHT LOWER QUADRANT: Primary | ICD-10-CM

## 2022-09-26 DIAGNOSIS — R10.31 ABDOMINAL PAIN, RIGHT LOWER QUADRANT: ICD-10-CM

## 2022-09-26 LAB
ALBUMIN UR-MCNC: NEGATIVE MG/DL
APPEARANCE UR: CLEAR
BASOPHILS # BLD AUTO: 0 10E3/UL (ref 0–0.2)
BASOPHILS NFR BLD AUTO: 0 %
BILIRUB UR QL STRIP: NEGATIVE
COLOR UR AUTO: YELLOW
EOSINOPHIL # BLD AUTO: 0.2 10E3/UL (ref 0–0.7)
EOSINOPHIL NFR BLD AUTO: 2 %
ERYTHROCYTE [DISTWIDTH] IN BLOOD BY AUTOMATED COUNT: 13 % (ref 10–15)
GLUCOSE UR STRIP-MCNC: NEGATIVE MG/DL
HCT VFR BLD AUTO: 40.1 % (ref 40–53)
HGB BLD-MCNC: 13.5 G/DL (ref 13.3–17.7)
HGB UR QL STRIP: NEGATIVE
IMM GRANULOCYTES # BLD: 0 10E3/UL
IMM GRANULOCYTES NFR BLD: 0 %
KETONES UR STRIP-MCNC: NEGATIVE MG/DL
LEUKOCYTE ESTERASE UR QL STRIP: NEGATIVE
LYMPHOCYTES # BLD AUTO: 2.9 10E3/UL (ref 0.8–5.3)
LYMPHOCYTES NFR BLD AUTO: 27 %
MCH RBC QN AUTO: 30.6 PG (ref 26.5–33)
MCHC RBC AUTO-ENTMCNC: 33.7 G/DL (ref 31.5–36.5)
MCV RBC AUTO: 91 FL (ref 78–100)
MONOCYTES # BLD AUTO: 0.7 10E3/UL (ref 0–1.3)
MONOCYTES NFR BLD AUTO: 7 %
NEUTROPHILS # BLD AUTO: 6.8 10E3/UL (ref 1.6–8.3)
NEUTROPHILS NFR BLD AUTO: 64 %
NITRATE UR QL: NEGATIVE
PH UR STRIP: 7 [PH] (ref 5–7)
PLATELET # BLD AUTO: 240 10E3/UL (ref 150–450)
RBC # BLD AUTO: 4.41 10E6/UL (ref 4.4–5.9)
SP GR UR STRIP: 1.02 (ref 1–1.03)
UROBILINOGEN UR STRIP-ACNC: 0.2 E.U./DL
WBC # BLD AUTO: 10.7 10E3/UL (ref 4–11)

## 2022-09-26 PROCEDURE — 81003 URINALYSIS AUTO W/O SCOPE: CPT | Performed by: PHYSICIAN ASSISTANT

## 2022-09-26 PROCEDURE — 36415 COLL VENOUS BLD VENIPUNCTURE: CPT | Performed by: PHYSICIAN ASSISTANT

## 2022-09-26 PROCEDURE — 85025 COMPLETE CBC W/AUTO DIFF WBC: CPT | Performed by: PHYSICIAN ASSISTANT

## 2022-09-26 PROCEDURE — 74019 RADEX ABDOMEN 2 VIEWS: CPT | Mod: TC | Performed by: RADIOLOGY

## 2022-09-26 PROCEDURE — 99214 OFFICE O/P EST MOD 30 MIN: CPT | Performed by: PHYSICIAN ASSISTANT

## 2022-09-26 NOTE — PROGRESS NOTES
"  Assessment & Plan     Abdominal pain, right lower quadrant  X-ray, CBC, and UA are within normal limits. Continue to monitor symptoms. Discussed in detail symptoms that would warrant emergent evaluation in the ED. Patient agrees with plan and will follow up as needed.      - XR Abdomen 2 Views; Future  - CBC with platelets and differential; Future  - UA Macro with Reflex to Micro and Culture - lab collect; Future  - UA Macro with Reflex to Micro and Culture - lab collect  - CBC with platelets and differential             BMI:   Estimated body mass index is 36.88 kg/m  as calculated from the following:    Height as of 10/4/21: 1.778 m (5' 10\").    Weight as of this encounter: 116.6 kg (257 lb).           Return in about 2 days (around 9/28/2022), or if symptoms worsen or fail to improve.    KALI Prajapati [facility] [place]              Subjective   Chief Complaint   Patient presents with     Abdominal Pain     Right side mid-abdominal pain.  Started suddenly today.      Diarrhea     On/off for couple weeks. Recently started colon cleanse.          HPI     Abdominal Pain    Location: RLQ     Pain character: sharp and stabbing,   Severity: 8 on a scale of 1-10.    Duration: today  Course of Illness: stable.  Exacerbated by: nothing  Relieved by: nothing.  Associated Symptoms: none.  Female : Not applicable  Surgical History: none              Review of Systems   Constitutional, HEENT, cardiovascular, pulmonary, gi and gu systems are negative, except as otherwise noted.      Objective    /79 (BP Location: Right arm, Patient Position: Sitting, Cuff Size: Adult Large)   Pulse 89   Temp 98.3  F (36.8  C) (Tympanic)   Wt 116.6 kg (257 lb)   SpO2 100%   BMI 36.88 kg/m    Body mass index is 36.88 kg/m .     Physical Exam  Constitutional:       General: He is not in acute distress.     Appearance: He is well-developed.   HENT:      Head: Normocephalic and atraumatic.      Right " Ear: Tympanic membrane and ear canal normal.      Left Ear: Tympanic membrane and ear canal normal.   Eyes:      Conjunctiva/sclera: Conjunctivae normal.   Cardiovascular:      Rate and Rhythm: Normal rate and regular rhythm.   Pulmonary:      Effort: Pulmonary effort is normal.      Breath sounds: Normal breath sounds.   Abdominal:      General: Bowel sounds are normal.      Palpations: Abdomen is soft.      Tenderness: There is abdominal tenderness in the right lower quadrant. There is no guarding or rebound.   Skin:     General: Skin is warm and dry.      Findings: No rash.   Psychiatric:         Behavior: Behavior normal.

## 2022-10-17 ENCOUNTER — ANCILLARY PROCEDURE (OUTPATIENT)
Dept: GENERAL RADIOLOGY | Facility: CLINIC | Age: 32
End: 2022-10-17
Attending: PHYSICIAN ASSISTANT
Payer: COMMERCIAL

## 2022-10-17 ENCOUNTER — OFFICE VISIT (OUTPATIENT)
Dept: URGENT CARE | Facility: URGENT CARE | Age: 32
End: 2022-10-17
Payer: COMMERCIAL

## 2022-10-17 VITALS
DIASTOLIC BLOOD PRESSURE: 82 MMHG | WEIGHT: 253 LBS | TEMPERATURE: 98.6 F | BODY MASS INDEX: 36.3 KG/M2 | SYSTOLIC BLOOD PRESSURE: 134 MMHG | HEART RATE: 98 BPM | OXYGEN SATURATION: 98 %

## 2022-10-17 DIAGNOSIS — S59.911A INJURY OF RIGHT FOREARM, INITIAL ENCOUNTER: Primary | ICD-10-CM

## 2022-10-17 DIAGNOSIS — S59.911A INJURY OF RIGHT FOREARM, INITIAL ENCOUNTER: ICD-10-CM

## 2022-10-17 PROCEDURE — 99213 OFFICE O/P EST LOW 20 MIN: CPT | Performed by: PHYSICIAN ASSISTANT

## 2022-10-17 PROCEDURE — 73090 X-RAY EXAM OF FOREARM: CPT | Mod: TC | Performed by: RADIOLOGY

## 2022-10-17 RX ORDER — NAPROXEN 500 MG/1
500 TABLET ORAL 2 TIMES DAILY WITH MEALS
Qty: 30 TABLET | Refills: 0 | Status: SHIPPED | OUTPATIENT
Start: 2022-10-17 | End: 2024-01-20

## 2022-10-17 NOTE — PROGRESS NOTES
Assessment & Plan     Injury of right forearm, initial encounter  Reassurance, no acute fracture. Fitted with wrist splint to wear as needed for comfort. Can take naproxen two times daily x 5-10 days for pain and inflammation. Avoid aggravating factors but encouraged gentle stretching/ROM. Return to clinic if symptoms worsen or do not improve; otherwise follow up as needed.           - XR Forearm Right 2 Views; Future  - Wrist/Arm/Hand Supplies Order for DME - ONLY FOR DME  - naproxen (NAPROSYN) 500 MG tablet; Take 1 tablet (500 mg) by mouth 2 times daily (with meals)                 Return in about 1 week (around 10/24/2022), or if symptoms worsen or fail to improve.    Emelia Cagle PA-C  [facility] [place]            Subjective   Chief Complaint   Patient presents with     Trauma     Playing football with kid 2 saturdays ago, dove and hurt right wrist. Says last night noticed it was swollen and 3 fingers were a little numb.         HPI     Wrist injury   Onset of symptoms was 1.5 week(s) ago.  Course of illness is same.    Severity   Current and Associated symptoms: right forearm injury   Treatment measures tried include Tylenol/Ibuprofen.  Predisposing factors include None.                Review of Systems   Constitutional, HEENT, cardiovascular, pulmonary, gi and gu systems are negative, except as otherwise noted.      Objective    /82   Pulse 98   Temp 98.6  F (37  C) (Tympanic)   Wt 114.8 kg (253 lb)   SpO2 98%   BMI 36.30 kg/m    Body mass index is 36.3 kg/m .  Physical Exam  Constitutional:       General: He is not in acute distress.  Musculoskeletal:      Comments: Right forearm and wrist has no redness, swelling, or bruising. There is tenderness with palpation of right forearm and wrist. Painful active ROM. Good capillary refill.    Neurological:      Mental Status: He is alert.            Xray - Reviewed and interpreted by me.  No acute fracture

## 2022-11-19 ENCOUNTER — HEALTH MAINTENANCE LETTER (OUTPATIENT)
Age: 32
End: 2022-11-19

## 2023-05-02 ENCOUNTER — OFFICE VISIT (OUTPATIENT)
Dept: URGENT CARE | Facility: URGENT CARE | Age: 33
End: 2023-05-02
Payer: COMMERCIAL

## 2023-05-02 VITALS
SYSTOLIC BLOOD PRESSURE: 115 MMHG | OXYGEN SATURATION: 96 % | HEART RATE: 66 BPM | DIASTOLIC BLOOD PRESSURE: 63 MMHG | TEMPERATURE: 97.8 F | WEIGHT: 215 LBS | RESPIRATION RATE: 24 BRPM | BODY MASS INDEX: 30.85 KG/M2

## 2023-05-02 DIAGNOSIS — S46.912A MUSCLE STRAIN OF LEFT SHOULDER, INITIAL ENCOUNTER: Primary | ICD-10-CM

## 2023-05-02 PROCEDURE — 99213 OFFICE O/P EST LOW 20 MIN: CPT | Performed by: PHYSICIAN ASSISTANT

## 2023-05-02 RX ORDER — NAPROXEN 500 MG/1
500 TABLET ORAL 2 TIMES DAILY WITH MEALS
Qty: 30 TABLET | Refills: 0 | Status: SHIPPED | OUTPATIENT
Start: 2023-05-02 | End: 2024-01-20

## 2023-05-02 RX ORDER — CYCLOBENZAPRINE HCL 10 MG
10 TABLET ORAL 3 TIMES DAILY PRN
Qty: 30 TABLET | Refills: 0 | Status: SHIPPED | OUTPATIENT
Start: 2023-05-02 | End: 2024-01-20

## 2023-05-02 NOTE — PROGRESS NOTES
Assessment & Plan     Muscle strain of left shoulder, initial encounter  Reassurance, likely muscle strain. Can take naproxen two times daily x 5-10 days for pain and inflammation. Avoid aggravating factors but encouraged gentle stretching/ROM. Return to clinic if symptoms worsen or do not improve; otherwise follow up as needed.       - naproxen (NAPROSYN) 500 MG tablet; Take 1 tablet (500 mg) by mouth 2 times daily (with meals)  - cyclobenzaprine (FLEXERIL) 10 MG tablet; Take 1 tablet (10 mg) by mouth 3 times daily as needed for muscle spasms                 Return in about 1 week (around 5/9/2023), or if symptoms worsen or fail to improve.    Emelia Cagle PA-C  [facility] [place]              Subjective   Chief Complaint   Patient presents with     Shoulder Pain     DOI 04/30/23 - Left Shoulder Pain. Felt a Pop While Lifting Weights on Sunday.      Neck Pain     Feels Pain When He Turns His Head To The Left          HPI     Shoulder pain    Onset of symptoms was 2 day(s) ago.  Course of illness is worsening.    Severity moderate  Current and Associated symptoms: left shoulder pain, was lifting and felt a pop, worsening pain   Treatment measures tried include ice.  Predisposing factors include was lifting weights .                  Review of Systems   Constitutional, HEENT, cardiovascular, pulmonary, gi and gu systems are negative, except as otherwise noted.      Objective    /63   Pulse 66   Temp 97.8  F (36.6  C)   Resp 24   Wt 97.5 kg (215 lb)   SpO2 96%   BMI 30.85 kg/m    Body mass index is 30.85 kg/m .  Physical Exam  Constitutional:       General: He is not in acute distress.  Musculoskeletal:        Arms:       Comments: There is muscular tenderness with palpation around left shoulder blade. Left shoulder joint is non-tender with palpation. Patient has full active ROM of left shoulder.    Neurological:      Mental Status: He is alert.

## 2023-07-02 ENCOUNTER — HEALTH MAINTENANCE LETTER (OUTPATIENT)
Age: 33
End: 2023-07-02

## 2023-07-30 ENCOUNTER — OFFICE VISIT (OUTPATIENT)
Dept: URGENT CARE | Facility: URGENT CARE | Age: 33
End: 2023-07-30
Payer: COMMERCIAL

## 2023-07-30 VITALS
OXYGEN SATURATION: 99 % | SYSTOLIC BLOOD PRESSURE: 114 MMHG | BODY MASS INDEX: 30.99 KG/M2 | TEMPERATURE: 97.4 F | DIASTOLIC BLOOD PRESSURE: 71 MMHG | WEIGHT: 216 LBS | HEART RATE: 101 BPM

## 2023-07-30 DIAGNOSIS — H10.9 BACTERIAL CONJUNCTIVITIS OF RIGHT EYE: Primary | ICD-10-CM

## 2023-07-30 PROCEDURE — 99213 OFFICE O/P EST LOW 20 MIN: CPT

## 2023-07-30 RX ORDER — ERYTHROMYCIN 5 MG/G
0.5 OINTMENT OPHTHALMIC 2 TIMES DAILY
Qty: 10 G | Refills: 0 | Status: SHIPPED | OUTPATIENT
Start: 2023-07-30 | End: 2023-08-09

## 2023-07-30 NOTE — PATIENT INSTRUCTIONS
Diagnosis:  bacterial conjunctivitis     Plan:   Antibiotic ointment    Cool compress   Tylenol or ibuprofen   Antihistamines as needed - allergy    Contagious - avoid touching eye  Can return to school/work/dayare 24 hours after antibiotics are started   Discard any eye makeup  Wash bedding- pillow cases, towels, washcloths     Monitor for:   Eyelid swells more  Eye pain gets worse  Redness or drainage from the eye gets worse  Blurry vision gets worse or you have increased sensitivity to light  Normal vision does not return within 24 to 48 hours         CONJUNCTIVITIS  - CAN BE VIRAL, BACTERIAL, OR ALLERGIC  The membrane that covers the white part of your eye (the conjunctiva) is inflamed.   Inflammation happens when your body responds to an injury, allergic reaction, infection, or illness.   Conjunctivitis can be caused by a bacteria (pink eye) or it can be viral from a cold   Symptoms of inflammation in the eye may include redness, irritation, itching, swelling, or burning.   These symptoms should go away within the next 24 hours.   Conjunctivitis may be related to a particle that was in your eye.   If so, it may wash out with your tears or irrigation treatment.

## 2023-07-30 NOTE — PROGRESS NOTES
URGENT CARE  Assessment & Plan   Assessment:   Hussain Warren is a 32 year old male who's clinical presentation today is consistent with:   1. Bacterial conjunctivitis of right eye  - erythromycin (ROMYCIN) 5 MG/GM ophthalmic ointment;   - Adult Eye  Referral; Future    Plan:  Will treat patient's bacterial conjunctivitis with topical antibiotic therapy at this time.   Encouraged patient for pain they should use Ibuprofen and/or tylenol as needed. Additionally a cool or warm moist compresses over the affected eye, as needed may also help relieve the discomfort  Educated patient on the infective/ contagious nature of this condition. Informed parent that child can return to work} in 24 hrs after antibiotic treatment. Encouraged strict hand washing, avoiding touching the eye, not sharing towels or bedding to prevent spread of infection.  Additionally we discussed if symptoms do not improve after starting today's treatment (or if symptoms worsen) to follow up in 3-5 days.    No alarm signs or symptoms present   Differential Diagnoses for this patient's chief complaint that I considered include: Conjunctivitis: bacterial vs viral or allergic, foreign body, Dry eye, blepharitis, corneal abrasion, corneal ulcer, keratitis, uveitis/iritis, acute angle glaucoma, Hordeolum / chalazion / Stye,  subconjunctival hemorrhage, Contact lens abuse  Patient is agreeable to treatment plan and state they will follow-up if symptoms do not improve and/or if symptoms worsen   see patient's AVS 'monitor for' section for specific patient instructions given and discussed regarding what to watch for and when to follow up    ANAYA Velasco [facility] [place]      ______________________________________________________________________      Subjective     HPI: Hussain Warren  is a 32 year old  male who presents today for evaluation the following concerns:   Patient ,  presents for evaluation of right} eye  that they describe as having increased  swelling of skin around the eye, redness, having purulent drainage in the mornings   Patient denies any foreign body sensation  Patient states symptoms started a few days ago    Patient denies any incident or trauma to the eyes  Patient denies any other viral URI symptoms.}   Patient denies any Increasing eye pain or worsening/changes in vision/ visual acuity/ blurry vision. patient denies any redness of the eyelids or surrounding soft tissue/skin  Patient denies any  photosensitively, pain with eye movement.       Review of Systems:  Pertinent review of systems as reflected in HPI, otherwise negative.     Objective    Physical Exam:  Vitals:    07/30/23 1033   BP: 114/71   Pulse: 101   Temp: 97.4  F (36.3  C)   TempSrc: Temporal   SpO2: 99%   Weight: 98 kg (216 lb)     General: Alert and oriented, no acute distress, Vital signs reviewed: afebrile,  Normotensive  Psy/mental status: Cooperative, nonanxious  SKIN: Intact, no rashes  EYES:   EOMs intact, PERRLA bilaterally   Conjunctiva: slight injection and erythema to conjunctiva of right  eye.   Drainage: slight drainage noted   Soft tissue swelling noted around eye, no erythemia    No photophobia noted     No visual acuity changes noted, no blur vision noted  NOSE:  mucosa moist  MOUTH/THROAT: lips, tongue, & oral mucosa appear normal upon inspection   LUNG: normal work of breathing, good respiratory effort without retractions, good air  movement, non labored, inspection reveals normal chest expansion w/  inspiration       ______________________________________________________________________    I explained my diagnostic considerations and recommendations to the patient, who voiced understanding and agreement with the treatment plan.   All questions were answered.   We discussed potential side effects, risks and benefits of any prescribed or recommended therapies, as well as expectations for response to treatments.  Please  see AVS for any patient instructions & handouts given.   Patient was advised to contact the Nurse Care Line, their Primary Care provider, Urgent Care, or the Emergency Department if there are new or worsening symptoms, or call 911 for emergencies.

## 2023-08-16 ENCOUNTER — APPOINTMENT (OUTPATIENT)
Dept: GENERAL RADIOLOGY | Facility: CLINIC | Age: 33
End: 2023-08-16
Attending: EMERGENCY MEDICINE
Payer: OTHER MISCELLANEOUS

## 2023-08-16 ENCOUNTER — HOSPITAL ENCOUNTER (EMERGENCY)
Facility: CLINIC | Age: 33
Discharge: HOME | End: 2023-08-16
Attending: EMERGENCY MEDICINE | Admitting: EMERGENCY MEDICINE
Payer: OTHER MISCELLANEOUS

## 2023-08-16 VITALS
RESPIRATION RATE: 18 BRPM | BODY MASS INDEX: 30.1 KG/M2 | WEIGHT: 215 LBS | HEIGHT: 71 IN | SYSTOLIC BLOOD PRESSURE: 123 MMHG | DIASTOLIC BLOOD PRESSURE: 73 MMHG | TEMPERATURE: 97.2 F | HEART RATE: 73 BPM | OXYGEN SATURATION: 97 %

## 2023-08-16 DIAGNOSIS — S66.911A STRAIN OF RIGHT WRIST, INITIAL ENCOUNTER: ICD-10-CM

## 2023-08-16 PROCEDURE — 73110 X-RAY EXAM OF WRIST: CPT | Mod: RT

## 2023-08-16 PROCEDURE — 99283 EMERGENCY DEPT VISIT LOW MDM: CPT | Performed by: EMERGENCY MEDICINE

## 2023-08-16 PROCEDURE — 99284 EMERGENCY DEPT VISIT MOD MDM: CPT

## 2023-08-16 ASSESSMENT — ACTIVITIES OF DAILY LIVING (ADL): ADLS_ACUITY_SCORE: 35

## 2023-08-16 NOTE — ED TRIAGE NOTES
Pt tripped while holding a door this AM. Presents with right wrist pain.      Triage Assessment       Row Name 08/16/23 0968       Triage Assessment (Adult)    Airway WDL WDL       Respiratory WDL    Respiratory WDL WDL       Skin Circulation/Temperature WDL    Skin Circulation/Temperature WDL WDL       Cardiac WDL    Cardiac WDL WDL       Peripheral/Neurovascular WDL    Peripheral Neurovascular WDL WDL       Cognitive/Neuro/Behavioral WDL    Cognitive/Neuro/Behavioral WDL WDL
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
## 2023-08-16 NOTE — DISCHARGE INSTRUCTIONS
Return if symptoms worsen or new symptoms develop.  Follow-up with primary care physician next available.  He is wrist guard as needed.  If increased pain weakness numbness or other symptoms present please return for further evaluation and care.

## 2023-08-16 NOTE — Clinical Note
Hussain Warren was seen and treated in our emergency department on 8/16/2023.  He may return to work on 08/17/2023.  No heavy lifting until 8/20/2023     If you have any questions or concerns, please don't hesitate to call.      Jermaine Kirkpatrick MD

## 2024-01-20 ENCOUNTER — OFFICE VISIT (OUTPATIENT)
Dept: URGENT CARE | Facility: URGENT CARE | Age: 34
End: 2024-01-20
Payer: COMMERCIAL

## 2024-01-20 ENCOUNTER — ANCILLARY PROCEDURE (OUTPATIENT)
Dept: GENERAL RADIOLOGY | Facility: CLINIC | Age: 34
End: 2024-01-20
Attending: NURSE PRACTITIONER
Payer: COMMERCIAL

## 2024-01-20 VITALS
BODY MASS INDEX: 31.38 KG/M2 | TEMPERATURE: 97.8 F | HEART RATE: 75 BPM | DIASTOLIC BLOOD PRESSURE: 78 MMHG | WEIGHT: 225 LBS | RESPIRATION RATE: 16 BRPM | OXYGEN SATURATION: 96 % | SYSTOLIC BLOOD PRESSURE: 116 MMHG

## 2024-01-20 DIAGNOSIS — S46.911A STRAIN OF RIGHT SHOULDER, INITIAL ENCOUNTER: ICD-10-CM

## 2024-01-20 DIAGNOSIS — S46.911A STRAIN OF RIGHT SHOULDER, INITIAL ENCOUNTER: Primary | ICD-10-CM

## 2024-01-20 PROCEDURE — 99214 OFFICE O/P EST MOD 30 MIN: CPT | Performed by: NURSE PRACTITIONER

## 2024-01-20 PROCEDURE — 73030 X-RAY EXAM OF SHOULDER: CPT | Mod: TC | Performed by: RADIOLOGY

## 2024-01-20 RX ORDER — MULTIPLE VITAMINS W/ MINERALS TAB 9MG-400MCG
1 TAB ORAL DAILY
COMMUNITY

## 2024-01-20 RX ORDER — CYCLOBENZAPRINE HCL 5 MG
5-10 TABLET ORAL 2 TIMES DAILY PRN
Qty: 60 TABLET | Refills: 0 | Status: SHIPPED | OUTPATIENT
Start: 2024-01-20 | End: 2024-02-19

## 2024-01-20 NOTE — PROGRESS NOTES
SUBJECTIVE:  Hussain Warren is a 33 year old male  who is seen for  right shoulder pain that started   3 weeks ago.   Onset of injury: unknown  Immediate symptoms: delayed pain.  5/10  Relieving Factors:  Nothing, Ice, Heat, and Ibuprofen   Symptoms have been gradual since that time.  Prior history of related problems: no prior problems with this area in the past.    Past Medical History:   Diagnosis Date    Back pain     Depressive disorder     Other acne        Social History     Tobacco Use    Smoking status: Former     Packs/day: .4     Types: Cigarettes     Quit date: 2018     Years since quittin.4    Smokeless tobacco: Current     Types: Chew   Vaping Use    Vaping Use: Never used   Substance Use Topics    Alcohol use: Yes     Comment: Occ    Drug use: No         ROS:  Review of systems negative except as stated above.    OBJECTIVE:  Blood pressure 116/78, pulse 75, temperature 97.8  F (36.6  C), temperature source Tympanic, resp. rate 16, weight 102.1 kg (225 lb), SpO2 96%.   GENERAL: healthy, alert and no distress  SKIN: no suspicious lesions or rashes  NEURO: Normal strength and tone, mentation intact and speech normal    Palpation:  Non-tender SC joint, clavicle, AC joint, acromion, subacromial space, proximal bicep tendon and upper trapezius muscle   Range of Motion        Active:all normal        Passive: all normal  Strength: rotator cuff strength full  Special tests: Negative Neer's test, Negative Mccullough, Negative Cross-Arm adduction, Negative Anterior Apprehension, Negative Posterior Apprehension, Negative Sulcus Sign, Negative Celeste's    X-RAY INTERPRETATION  X-ray reviewed and interpreted by myself in office no acute findings will await final radiology report      ASSESSMENT  (D43.513W) Strain of right shoulder, initial encounter  (primary encounter diagnosis)  Comment: Symptoms also representative of a strain versus bursitis we will have patient start with physical therapy and muscle  relaxers and limited lifting of weights no more than 10 pounds and will follow-up if not improving over the next 2 to 3 weeks  Plan: XR Shoulder Right G/E 3 Views, Physical Therapy        Referral, cyclobenzaprine (FLEXERIL) 5 MG         tablet            PLAN:   NSAID, ice suggested  activity modification  referral to physical therapy      ANAYA Bojorquez CNP     K

## 2024-02-09 ENCOUNTER — THERAPY VISIT (OUTPATIENT)
Dept: PHYSICAL THERAPY | Facility: CLINIC | Age: 34
End: 2024-02-09
Attending: NURSE PRACTITIONER
Payer: COMMERCIAL

## 2024-02-09 DIAGNOSIS — S46.911A STRAIN OF RIGHT SHOULDER, INITIAL ENCOUNTER: ICD-10-CM

## 2024-02-09 PROCEDURE — 97140 MANUAL THERAPY 1/> REGIONS: CPT | Mod: GP | Performed by: PHYSICAL MEDICINE & REHABILITATION

## 2024-02-09 PROCEDURE — 97161 PT EVAL LOW COMPLEX 20 MIN: CPT | Mod: GP | Performed by: PHYSICAL MEDICINE & REHABILITATION

## 2024-02-09 PROCEDURE — 97110 THERAPEUTIC EXERCISES: CPT | Mod: GP | Performed by: PHYSICAL MEDICINE & REHABILITATION

## 2024-02-09 ASSESSMENT — ACTIVITIES OF DAILY LIVING (ADL)
CARRYING_A_HEAVY_OBJECT_OF_10_POUNDS: 0
PUTTING_ON_A_SHIRT_THAT_BUTTONS_DOWN_THE_FRONT: 0
WASHING_YOUR_HAIR?: 2
PLEASE_INDICATE_YOR_PRIMARY_REASON_FOR_REFERRAL_TO_THERAPY:: SHOULDER
REACHING_FOR_SOMETHING_ON_A_HIGH_SHELF: 6
PUSHING_WITH_THE_INVOLVED_ARM: 6
PUTTING_ON_YOUR_PANTS: 0
TOUCHING_THE_BACK_OF_YOUR_NECK: 2
REMOVING_SOMETHING_FROM_YOUR_BACK_POCKET: 0
WHEN_LYING_ON_THE_INVOLVED_SIDE: 6

## 2024-02-09 NOTE — PROGRESS NOTES
PHYSICAL THERAPY EVALUATION  Type of Visit: Evaluation    See electronic medical record for Abuse and Falls Screening details.    Subjective   Pt arrived to PT today for acute R shoulder pain that started in the beginning of January. Notes he was at the gym and felt a sharp pull/tug. Shared pain has improved but still nagging and present. Shared most pain with chest workouts and shoulder workouts. Not as much pain with biceps/triceps and back workouts.   Presenting condition or subjective complaint: nagging shoulder pain  Date of onset: 01/02/24   Relevant medical history per pt report:   unremarkable    Prior diagnostic imaging/testing results: X-ray     Prior therapy history for the same diagnosis, illness or injury:    no    Prior Level of Function  Transfers: Independent  Ambulation: Independent  ADL: Independent    Living Environment  Social support: With a significant other or spouse   Type of home: House   Ramp: No   Stairs inside the home: Yes 16 Is there a railing: Yes   Help at home: None     Employment: Yes   Hobbies/Interests: sports, fitness, automotive    Patient goals for therapy: i can do everything i usually do just not always comfortably    Pain assessment:  see subjective     Objective   SHOULDER EVALUATION  INTEGUMENTARY (edema, incisions): WNL  POSTURE: Sitting Posture: Rounded shoulders  GAIT:   Weightbearing Status: WBAT  Assistive Device(s): None  Gait Deviations: WNL  AROM: flexion: 165* (feels sore), abd: 155* (notes increased sx), ER: T3/4 (notes increased tightness and pain), IR: T10/11 (notes increased pain)  PROM: ER at 90*: 90*, IR at 90*: 70*, Flexion: 170* (pain), abd: 180* (pain)  STRENGTH: 5/5 global strength B (notes increased pain with IR on R)  FLEXIBILITY: limited pecs and upper traps B  SPECIAL TESTS: neg neers, pos lift off, notes slight increase in obriens  PALPATION: notes tenderness over biceps long T   JOINT MOBILITY: hypomobility of R GHJ, ACJ and  SCJ  CERVICAL SCREEN: WFL    Assessment & Plan   CLINICAL IMPRESSIONS  Medical Diagnosis: Strain of right shoulder, initial encounter    Treatment Diagnosis: R shoulder pain  Impression/Assessment: Patient is a 33 year old male with R shoulder pain complaints.  The following significant findings have been identified: Pain, Decreased ROM/flexibility, Decreased joint mobility, Decreased strength, Impaired muscle performance, Decreased activity tolerance, and Impaired posture. These impairments interfere with their ability to perform self care tasks, work tasks, recreational activities, household chores, driving , household mobility, and community mobility as compared to previous level of function.     Clinical Decision Making (Complexity):  Clinical Presentation: Stable/Uncomplicated  Clinical Presentation Rationale: based on medical and personal factors listed in PT evaluation  Clinical Decision Making (Complexity): Low complexity    PLAN OF CARE  Treatment Interventions:  Modalities: Cryotherapy, E-stim, Hot Pack, Mechanical Traction, Ultrasound, TENS  Interventions: Manual Therapy, Neuromuscular Re-education, Therapeutic Activity, Therapeutic Exercise, Self-Care/Home Management    Long Term Goals   PT Goal 1  Goal Identifier: 1  Goal Description: Pt will be able to  demonstrate full R shoulder ROM without pian in order to decrease difficulty with ADLs  Target Date: 03/08/24  PT Goal 2  Goal Identifier: 2  Goal Description: Pt will be able to demonstrate 5/5 R shoulder strength without pain in order to decrease difficulty with work  Target Date: 03/22/24  PT Goal 3  Goal Identifier: 3  Goal Description: Pt will be independent with HEP in order to self manage symptoms  Target Date: 04/05/24  PT Goal 4  Goal Identifier: 4  Goal Description: Pt will be able to return to chest workouts at gym without pain in order to return to PLOF  Target Date: 04/05/24    Frequency of Treatment: 1x/week  Duration of Treatment: 8  weeks    Recommended Referrals to Other Professionals:  none  Education Assessment:   Learner/Method: Patient;Listening;Reading;Demonstration;Pictures/Video    Risks and benefits of evaluation/treatment have been explained.   Patient/Family/caregiver agrees with Plan of Care.     Evaluation Time:   PT Eval, Low Complexity Minutes (13533): 15   Present: Not applicable     Signing Clinician: Evelyn Zheng PT    Please contact me with any questions or concerns.    Thank you for your referral,     Evelyn Zheng PT, DPT  Physical Therapist  [place] [id number] [place] [id number]  [phone]        [facility]                                                                                   OUTPATIENT PHYSICAL THERAPY    PLAN OF TREATMENT FOR OUTPATIENT REHABILITATION   Patient's Last Name, First Name, Hussain Nevarez YOB: 1990   Provider's Name   [facility]   Medical Record No.  [id number]     Onset Date:   1/2/24 Start of Care Date:  2/9/24     Medical Diagnosis:   Strain of right shoulder, initial encounter       PT Treatment Diagnosis:   R shoulder pain  Plan of Treatment  Frequency/Duration: 1x/week /  8 weeks    Certification date from   2/9/24 to    4/5/24       See note for plan of treatment details and functional goals     Evelyn Zheng PT                         I CERTIFY THE NEED FOR THESE SERVICES FURNISHED UNDER        THIS PLAN OF TREATMENT AND WHILE UNDER MY CARE     (Physician attestation of this document indicates review and certification of the therapy plan).              Referring Provider:  Nika Jamil    Initial Assessment  See Epic Evaluation-

## 2024-02-12 ENCOUNTER — THERAPY VISIT (OUTPATIENT)
Dept: PHYSICAL THERAPY | Facility: CLINIC | Age: 34
End: 2024-02-12
Attending: NURSE PRACTITIONER
Payer: COMMERCIAL

## 2024-02-12 DIAGNOSIS — S46.911A STRAIN OF RIGHT SHOULDER, INITIAL ENCOUNTER: Primary | ICD-10-CM

## 2024-02-12 PROCEDURE — 97110 THERAPEUTIC EXERCISES: CPT | Mod: GP | Performed by: PHYSICAL THERAPIST

## 2024-02-13 ENCOUNTER — OFFICE VISIT (OUTPATIENT)
Dept: FAMILY MEDICINE | Facility: CLINIC | Age: 34
End: 2024-02-13
Payer: COMMERCIAL

## 2024-02-13 VITALS
RESPIRATION RATE: 20 BRPM | HEIGHT: 71 IN | DIASTOLIC BLOOD PRESSURE: 86 MMHG | OXYGEN SATURATION: 97 % | WEIGHT: 227 LBS | BODY MASS INDEX: 31.78 KG/M2 | TEMPERATURE: 97 F | SYSTOLIC BLOOD PRESSURE: 125 MMHG | HEART RATE: 89 BPM

## 2024-02-13 DIAGNOSIS — S46.911D RIGHT SHOULDER STRAIN, SUBSEQUENT ENCOUNTER: Primary | ICD-10-CM

## 2024-02-13 PROCEDURE — 99213 OFFICE O/P EST LOW 20 MIN: CPT | Performed by: NURSE PRACTITIONER

## 2024-02-13 ASSESSMENT — PAIN SCALES - GENERAL: PAINLEVEL: NO PAIN (0)

## 2024-02-13 NOTE — LETTER
February 13, 2024      Hussain Warren  [address] [place] [address]        To Whom It May Concern:    Hussain Warren was seen in our clinic. He may return to work with no restrictions on 2/14/2024      Sincerely,      Kristin Alvarez MSN,P-[address] [address] [phone]

## 2024-02-13 NOTE — PROGRESS NOTES
"  Assessment & Plan     Right shoulder strain, subsequent encounter- improved with Physical Therapy   He may return to work with no restrictions on 2/14/2024               BMI  Estimated body mass index is 31.66 kg/m  as calculated from the following:    Height as of this encounter: 1.803 m (5' 11\").    Weight as of this encounter: 103 kg (227 lb).         Call or return to the clinic with any worsening of symptoms or no resolution. Patient/Parent verbalized understanding and is in agreement. Medication side effects reviewed.   Current Outpatient Medications   Medication Sig Dispense Refill    cyclobenzaprine (FLEXERIL) 5 MG tablet Take 1-2 tablets (5-10 mg) by mouth 2 times daily as needed for muscle spasms 60 tablet 0    Ascorbic Acid (VITAMIN C) 500 MG CAPS Take 2 capsules by mouth daily      multivitamin w/minerals (THERA-VIT-M) tablet Take 1 tablet by mouth daily      vitamin D3 (CHOLECALCIFEROL) 50 mcg (2000 units) tablet Take 1 tablet by mouth daily       Chart documentation with Dragon Voice recognition Software. Although reviewed after completion, some words and grammatical errors may remain.  Kristin Alvarez MSN,FNP-[address] [address]  [phone]        See Patient Instructions    Abbi Nixon is a 33 year old, presenting for the following health issues:  Shoulder        2/13/2024     3:48 PM   Additional Questions   Roomed by Luz Maria     History of Present Illness       Reason for visit:   note to lift restrictions    He eats 2-3 servings of fruits and vegetables daily.He consumes 0 sweetened beverage(s) daily.He exercises with enough effort to increase his heart rate 60 or more minutes per day.  He exercises with enough effort to increase his heart rate 5 days per week.   He is taking medications regularly.       Right shoulder follow up   Improving with Physical Therapy   Would like restrictions lifted.  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      Objective    /86   Pulse 89   Temp 97  F (36.1  C) (Tympanic)   Resp 20   Ht 1.803 m (5' 11\")   Wt 103 kg (227 lb)   SpO2 97%   BMI 31.66 kg/m    Body mass index is 31.66 kg/m .  Physical Exam   GENERAL: alert and no distress  EYES: Eyes grossly normal to inspection, PERRL and conjunctivae and sclerae normal  HENT: ear canals and TM's normal, nose and mouth without ulcers or lesions  NECK: no adenopathy, no asymmetry, masses, or scars  RESP: lungs clear to auscultation - no rales, rhonchi or wheezes  CV: regular rate and rhythm, normal S1 S2, no S3 or S4, no murmur, click or rub, no peripheral edema  ABDOMEN: soft, nontender, no hepatosplenomegaly, no masses and bowel sounds normal  MS: normal range of motion, peripheral pulses normal, and RUE exam shows normal strength and muscle mass, no erythema, induration, or nodules, no evidence of joint effusion, ROM of all joints is normal, and no evidence of joint instability  SKIN: no suspicious lesions or rashes  NEURO: Normal strength and tone, mentation intact and speech normal  PSYCH: mentation appears normal, affect normal/bright    Narrative & Impression   EXAM: XR SHOULDER RIGHT G/E 3 VIEWS  LOCATION: [facility]  DATE: 1/20/2024     INDICATION:  Strain of right shoulder, initial encounter  COMPARISON: None.                                                                      IMPRESSION: Normal joint spaces and alignment. No acute fracture. There is some irregularity " to the distal clavicle articular surface which could be artifactual due to projection versus early distal clavicular osteolysis. Clinical correlation is   recommended.           Signed Electronically by: ANAYA Arthur CNP

## 2024-03-01 ENCOUNTER — THERAPY VISIT (OUTPATIENT)
Dept: PHYSICAL THERAPY | Facility: CLINIC | Age: 34
End: 2024-03-01
Attending: NURSE PRACTITIONER
Payer: COMMERCIAL

## 2024-03-01 DIAGNOSIS — S46.911A STRAIN OF RIGHT SHOULDER, INITIAL ENCOUNTER: Primary | ICD-10-CM

## 2024-03-01 PROCEDURE — 97110 THERAPEUTIC EXERCISES: CPT | Mod: GP | Performed by: PHYSICAL MEDICINE & REHABILITATION

## 2024-03-01 PROCEDURE — 97140 MANUAL THERAPY 1/> REGIONS: CPT | Mod: GP | Performed by: PHYSICAL MEDICINE & REHABILITATION

## 2024-03-12 ENCOUNTER — THERAPY VISIT (OUTPATIENT)
Dept: PHYSICAL THERAPY | Facility: CLINIC | Age: 34
End: 2024-03-12
Attending: NURSE PRACTITIONER
Payer: COMMERCIAL

## 2024-03-12 DIAGNOSIS — S46.911A STRAIN OF RIGHT SHOULDER, INITIAL ENCOUNTER: Primary | ICD-10-CM

## 2024-03-12 PROCEDURE — 97140 MANUAL THERAPY 1/> REGIONS: CPT | Mod: GP | Performed by: PHYSICAL MEDICINE & REHABILITATION

## 2024-03-12 PROCEDURE — 97110 THERAPEUTIC EXERCISES: CPT | Mod: GP | Performed by: PHYSICAL MEDICINE & REHABILITATION

## 2024-03-19 ENCOUNTER — THERAPY VISIT (OUTPATIENT)
Dept: PHYSICAL THERAPY | Facility: CLINIC | Age: 34
End: 2024-03-19
Attending: NURSE PRACTITIONER
Payer: COMMERCIAL

## 2024-03-19 DIAGNOSIS — S46.911A STRAIN OF RIGHT SHOULDER, INITIAL ENCOUNTER: Primary | ICD-10-CM

## 2024-03-19 PROCEDURE — 97110 THERAPEUTIC EXERCISES: CPT | Mod: GP | Performed by: PHYSICAL THERAPIST

## 2024-03-19 PROCEDURE — 97140 MANUAL THERAPY 1/> REGIONS: CPT | Mod: GP | Performed by: PHYSICAL THERAPIST

## 2024-04-25 NOTE — PROGRESS NOTES
Outpatient Physical Therapy Discharge Note     Patient: Hussain Warren  : 1990    Beginning/End Dates of Reporting Period:  24 to 3/19/24    Referring Provider: ANAYA Sheffield CNP    Therapy Diagnosis: R shoulder pain     Patient did not return for follow up treatments.  Goal status and current objective information is therefore unknown.  Discharge from PT services at this time for this episode of treatment. Please see attached documentation under this episode of care for further information including dates of service, start of care date, referring physician, Dx, treatment plan, treatments, etc.    Please contact me with any questions or concerns.    Thank you for your referral.    Evelyn Zheng, PT, DPT  Physical Therapist  [address] [address] [phone]

## 2024-04-28 ENCOUNTER — APPOINTMENT (OUTPATIENT)
Dept: GENERAL RADIOLOGY | Facility: CLINIC | Age: 34
End: 2024-04-28
Attending: FAMILY MEDICINE
Payer: COMMERCIAL

## 2024-04-28 ENCOUNTER — HOSPITAL ENCOUNTER (EMERGENCY)
Facility: CLINIC | Age: 34
Discharge: HOME | End: 2024-04-28
Attending: FAMILY MEDICINE | Admitting: FAMILY MEDICINE
Payer: COMMERCIAL

## 2024-04-28 VITALS
SYSTOLIC BLOOD PRESSURE: 158 MMHG | DIASTOLIC BLOOD PRESSURE: 103 MMHG | OXYGEN SATURATION: 97 % | HEART RATE: 78 BPM | TEMPERATURE: 98.2 F | RESPIRATION RATE: 16 BRPM

## 2024-04-28 DIAGNOSIS — S97.81XA CRUSH INJURY OF FOOT, RIGHT, INITIAL ENCOUNTER: ICD-10-CM

## 2024-04-28 PROCEDURE — 99283 EMERGENCY DEPT VISIT LOW MDM: CPT | Performed by: FAMILY MEDICINE

## 2024-04-28 PROCEDURE — 73630 X-RAY EXAM OF FOOT: CPT | Mod: RT

## 2024-04-28 PROCEDURE — 99283 EMERGENCY DEPT VISIT LOW MDM: CPT

## 2024-04-28 ASSESSMENT — COLUMBIA-SUICIDE SEVERITY RATING SCALE - C-SSRS
6. HAVE YOU EVER DONE ANYTHING, STARTED TO DO ANYTHING, OR PREPARED TO DO ANYTHING TO END YOUR LIFE?: NO
1. IN THE PAST MONTH, HAVE YOU WISHED YOU WERE DEAD OR WISHED YOU COULD GO TO SLEEP AND NOT WAKE UP?: NO
2. HAVE YOU ACTUALLY HAD ANY THOUGHTS OF KILLING YOURSELF IN THE PAST MONTH?: NO

## 2024-04-28 ASSESSMENT — ACTIVITIES OF DAILY LIVING (ADL): ADLS_ACUITY_SCORE: 35

## 2024-04-29 NOTE — DISCHARGE INSTRUCTIONS
ICD-10-CM    1. Crush injury of foot, right, initial encounter  S97.81XA     no fracture on initial xray.  plan follow-up podiatry or sports med.  ice to the area on foir 20 min/hour x3 days.  crutches or cane as needed. avoid overuse and unweight the leg as much as possible.

## 2024-04-29 NOTE — ED TRIAGE NOTES
Dropped a 45lb disc weight on his right foot while at the gym today. Throughout the day has noticed increasing swelling and tenderness.     Triage Assessment (Adult)       Row Name 04/28/24 1925          Triage Assessment    Airway WDL WDL        Respiratory WDL    Respiratory WDL WDL        Skin Circulation/Temperature WDL    Skin Circulation/Temperature WDL WDL        Cardiac WDL    Cardiac WDL WDL        Peripheral/Neurovascular WDL    Peripheral Neurovascular WDL WDL

## 2024-04-29 NOTE — ED PROVIDER NOTES
History     Chief Complaint   Patient presents with    Foot Injury     HPI  Hussain Warren is a 33 year old male who presents with bipolar disorder anxiety attention deficit disorder tobacco abuse.  Presents here after dropping a 45 pound weight on his right foot when he was at the gym.  Pain only increased later tonight after he had gone out shopping returned home and was sitting for some time playing a video game and then got up to stand and had pain at the dorsum of the right foot proximally.  This is more over the fibular aspect and is associated with swelling.  No other injuries were sustained.  He was wearing shoes at the time of the injury.  No bleeding.  Has been able to ambulate although more painful.  No significant ankle pain or swelling.    Allergies:  No Known Allergies    Problem List:    Patient Active Problem List    Diagnosis Date Noted    Strain of right shoulder, initial encounter 02/09/2024     Priority: Medium    Corneal ulcer of left eye 06/24/2015     Priority: Medium    Tobacco use 02/24/2015     Priority: Medium    Elevated BP 02/24/2015     Priority: Medium    CARDIOVASCULAR SCREENING; LDL GOAL LESS THAN 160 02/24/2015     Priority: Medium    Insomnia 03/04/2013     Priority: Medium    Bipolar 1 disorder (H) 03/04/2013     Priority: Medium    ADHD (attention deficit hyperactivity disorder) 03/04/2013     Priority: Medium    Anxiety 02/28/2011     Priority: Medium    Other acne 12/08/2005     Priority: Medium        Past Medical History:    Past Medical History:   Diagnosis Date    Back pain     Depressive disorder     Other acne        Past Surgical History:    Past Surgical History:   Procedure Laterality Date    ORTHOPEDIC SURGERY      boxer's fracture at age 15       Family History:    Family History   Problem Relation Age of Onset    Hypertension Father         gout    Heart Disease Father     Hypertension Paternal Grandfather     Diabetes Paternal Grandmother         type 2,  Alzheimers    Diabetes Paternal Uncle     Hypertension Paternal Uncle     Hypertension Paternal Aunt     Hypertension Paternal Aunt     Breast Cancer Paternal Aunt        Social History:  Marital Status:   [2]  Social History     Tobacco Use    Smoking status: Former     Current packs/day: 0.00     Types: Cigarettes     Quit date: 2018     Years since quittin.6    Smokeless tobacco: Current     Types: Chew   Vaping Use    Vaping status: Never Used   Substance Use Topics    Alcohol use: Yes     Comment: Occ    Drug use: No        Medications:    Ascorbic Acid (VITAMIN C) 500 MG CAPS  multivitamin w/minerals (THERA-VIT-M) tablet  vitamin D3 (CHOLECALCIFEROL) 50 mcg (2000 units) tablet          Review of Systems    ROS:  5 point ROS negative except as noted above in HPI, including Gen., Resp., CV, GI &  system review.    Physical Exam   BP: (!) 158/103  Pulse: 78  Temp: 98.2  F (36.8  C)  Resp: 16  SpO2: 97 %    Physical Exam    Tenderness to palpation over the dorsum particularly laterally.    There is no focal tenderness over the Lisfranc joint over the fifth metatarsal or over the navicular.  Normal pulses.  Normal capillary refill with normal distal motor function although more painful in dorsiflexion and difficulty dorsiflexing.  Tenderness to palpation over the anterior aspect of the distal fibula with no obvious deformity.      ED Course        Procedures              Critical Care time:  none               Results for orders placed or performed during the hospital encounter of 24 (from the past 24 hour(s))   Foot  XR, G/E 3 views, right    Narrative    EXAM: XR FOOT RIGHT G/E 3 VIEWS  LOCATION: [facility]  DATE: 2024    INDICATION: Right foot pain after blunt trauma.  COMPARISON: None.      Impression    IMPRESSION: Right foot negative for acute fracture or joint malalignment. Chronic-appearing small ossicle over the dorsal aspect of the midfoot visible  on the lateral view. Normal joint spacing. Soft tissues are radiographically unremarkable.        Medications - No data to display    Assessments & Plan (with Medical Decision Making)     MDM: Hussain Warren is a 33 year old male presenting with a crush injury to the right foot.  I did offer crutches but he declines this currently.  Will have him walk with a hard soled shoe and be cautious and try nonweight the area and follow-up with podiatry.  I did note that often crush injuries have more significant soft tissue injury and may require MRI but at this point his exam was reassuring.  Also fracture may be missed in the first 5 days before fracture may be seen and did recommend to follow-up with nonsurgical podiatry a reexam and potentially repeat x-rays at that follow-up.  Precautions given for return additional recommendations as below  I have reviewed the nursing notes.    I have reviewed the findings, diagnosis, plan and need for follow up with the patient.           Medical Decision Making  The patient's presentation was of low complexity (an acute and uncomplicated illness or injury).    The patient's evaluation involved:  ordering and/or review of 1 test(s) in this encounter (see separate area of note for details)    The patient's management necessitated only low risk treatment.        Discharge Medication List as of 4/28/2024  8:16 PM          Final diagnoses:   Crush injury of foot, right, initial encounter - no fracture on initial xray.  plan follow-up podiatry or sports med.  ice to the area on foir 20 min/hour x3 days.  crutches or cane as needed. avoid overuse and unweight the leg as much as possible.        4/28/2024   [facility] EMERGENCY DEPT       Major Nielsen MD  04/28/24 [id number]

## 2024-06-11 PROBLEM — S46.911A STRAIN OF RIGHT SHOULDER, INITIAL ENCOUNTER: Status: RESOLVED | Noted: 2024-02-09 | Resolved: 2024-06-11

## 2024-08-25 ENCOUNTER — HEALTH MAINTENANCE LETTER (OUTPATIENT)
Age: 34
End: 2024-08-25

## 2024-09-04 ENCOUNTER — VIRTUAL VISIT (OUTPATIENT)
Dept: URGENT CARE | Facility: CLINIC | Age: 34
End: 2024-09-04
Payer: COMMERCIAL

## 2024-09-04 DIAGNOSIS — R05.1 ACUTE COUGH: Primary | ICD-10-CM

## 2024-09-04 DIAGNOSIS — R06.2 WHEEZING: ICD-10-CM

## 2024-09-04 PROCEDURE — 99213 OFFICE O/P EST LOW 20 MIN: CPT | Mod: 95 | Performed by: EMERGENCY MEDICINE

## 2024-09-04 RX ORDER — AZITHROMYCIN 250 MG/1
TABLET, FILM COATED ORAL
Qty: 6 TABLET | Refills: 0 | Status: SHIPPED | OUTPATIENT
Start: 2024-09-04 | End: 2024-09-09

## 2024-09-04 RX ORDER — ALBUTEROL SULFATE 90 UG/1
2 AEROSOL, METERED RESPIRATORY (INHALATION) 4 TIMES DAILY
Qty: 18 G | Refills: 0 | Status: SHIPPED | OUTPATIENT
Start: 2024-09-04 | End: 2024-09-11

## 2024-09-04 RX ORDER — PREDNISONE 50 MG/1
50 TABLET ORAL DAILY
Qty: 5 TABLET | Refills: 0 | Status: SHIPPED | OUTPATIENT
Start: 2024-09-04 | End: 2024-09-09

## 2024-09-04 NOTE — PROGRESS NOTES
Video visit:  Start time: 11:30 AM  Stop time: 11:39 AM  Duration: 9 minutes  Patient location: At home  Provider location: [facility] virtual provider (remote).  Platform used for video visit: [facility]      CHIEF COMPLAINT: URI symptoms, shortness of breath.      HPI: Patient is a 33-year-old male whose been ill for 5 days.  He has had a sore throat, cough, congestion, subjective wheezing and does feel dyspnea on exertion.  He has no history of asthma.  He does note subjective wheezing and has been put on an inhaler in the past with an upper respiratory infection.  Patient is a smoker.  COVID testing negative.      ROS: See HPI otherwise normal.    No Known Allergies   Current Outpatient Medications   Medication Sig Dispense Refill    albuterol (PROAIR HFA/PROVENTIL HFA/VENTOLIN HFA) 108 (90 Base) MCG/ACT inhaler Inhale 2 puffs into the lungs 4 times daily for 7 days. 18 g 0    azithromycin (ZITHROMAX) 250 MG tablet Take 2 tablets (500 mg) by mouth daily for 1 day, THEN 1 tablet (250 mg) daily for 4 days. 6 tablet 0    predniSONE (DELTASONE) 50 MG tablet Take 1 tablet (50 mg) by mouth daily for 5 days. 5 tablet 0    Ascorbic Acid (VITAMIN C) 500 MG CAPS Take 2 capsules by mouth daily      multivitamin w/minerals (THERA-VIT-M) tablet Take 1 tablet by mouth daily      vitamin D3 (CHOLECALCIFEROL) 50 mcg (2000 units) tablet Take 1 tablet by mouth daily           PE: No acute distress on video visit.  Alert and oriented.  Patient is nondyspneic appearing speaking full sentences.  He is noted to have an occasional bronchospastic sounding cough.        TREATMENT: None.      ASSESSMENT: Viral URI with reactive airway symptoms.  Patient stable for outpatient management.  Will initiate prednisone and albuterol and patient is to start Zithromax and for 5 days if not better.  He should be seen if any worsening shortness of breath.      DIAGNOSIS: Cough.  Wheezing.      PLAN: Prednisone, albuterol, start Z-Arnoldo and for 5  days if not better.

## 2024-09-05 ENCOUNTER — E-VISIT (OUTPATIENT)
Dept: URGENT CARE | Facility: CLINIC | Age: 34
End: 2024-09-05
Payer: COMMERCIAL

## 2024-09-05 DIAGNOSIS — R05.1 ACUTE COUGH: Primary | ICD-10-CM

## 2024-09-05 PROCEDURE — 99207 PR NON-BILLABLE SERV PER CHARTING: CPT | Performed by: PHYSICIAN ASSISTANT

## 2024-09-05 NOTE — PATIENT INSTRUCTIONS
"Dear Hussain Warren,    I sent a note for you. You can find it under \"letters\" in you MyChart.    Feel better,    Raisa Lindo PA-C    "

## 2024-09-05 NOTE — LETTER
[facility] VIRTUAL URG[facility]  [address] [address]  Phone: [phone]    September 5, 2024        Hussain Warren  [address] [place] [id number]          To whom it may concern:    RE: Hussain Warren    Patient was seen and evaluated 9/4/24. Please excuse him from work missed 9/3/24- 9/6/24.     Please contact me for questions or concerns.      Sincerely,    Eleanor Lindo PA-C  [facility] [place]

## 2024-10-21 ENCOUNTER — OFFICE VISIT (OUTPATIENT)
Dept: FAMILY MEDICINE | Facility: CLINIC | Age: 34
End: 2024-10-21
Payer: COMMERCIAL

## 2024-10-21 VITALS
BODY MASS INDEX: 36.34 KG/M2 | RESPIRATION RATE: 20 BRPM | WEIGHT: 259.6 LBS | DIASTOLIC BLOOD PRESSURE: 70 MMHG | TEMPERATURE: 98.5 F | HEART RATE: 112 BPM | HEIGHT: 71 IN | OXYGEN SATURATION: 97 % | SYSTOLIC BLOOD PRESSURE: 120 MMHG

## 2024-10-21 DIAGNOSIS — Z11.59 NEED FOR HEPATITIS C SCREENING TEST: ICD-10-CM

## 2024-10-21 DIAGNOSIS — L29.9 ITCHING: Primary | ICD-10-CM

## 2024-10-21 LAB
ALBUMIN SERPL BCG-MCNC: 4.4 G/DL (ref 3.5–5.2)
ALP SERPL-CCNC: 112 U/L (ref 40–150)
ALT SERPL W P-5'-P-CCNC: 46 U/L (ref 0–70)
ANION GAP SERPL CALCULATED.3IONS-SCNC: 11 MMOL/L (ref 7–15)
AST SERPL W P-5'-P-CCNC: 26 U/L (ref 0–45)
BASOPHILS # BLD AUTO: 0 10E3/UL (ref 0–0.2)
BASOPHILS NFR BLD AUTO: 0 %
BILIRUB SERPL-MCNC: <0.2 MG/DL
BUN SERPL-MCNC: 21.8 MG/DL (ref 6–20)
CALCIUM SERPL-MCNC: 9.6 MG/DL (ref 8.8–10.4)
CHLORIDE SERPL-SCNC: 104 MMOL/L (ref 98–107)
CREAT SERPL-MCNC: 0.98 MG/DL (ref 0.67–1.17)
EGFRCR SERPLBLD CKD-EPI 2021: >90 ML/MIN/1.73M2
EOSINOPHIL # BLD AUTO: 0.1 10E3/UL (ref 0–0.7)
EOSINOPHIL NFR BLD AUTO: 1 %
ERYTHROCYTE [DISTWIDTH] IN BLOOD BY AUTOMATED COUNT: 13.2 % (ref 10–15)
GLUCOSE SERPL-MCNC: 112 MG/DL (ref 70–99)
HCO3 SERPL-SCNC: 26 MMOL/L (ref 22–29)
HCT VFR BLD AUTO: 42 % (ref 40–53)
HGB BLD-MCNC: 14.3 G/DL (ref 13.3–17.7)
IMM GRANULOCYTES # BLD: 0 10E3/UL
IMM GRANULOCYTES NFR BLD: 0 %
LYMPHOCYTES # BLD AUTO: 2.7 10E3/UL (ref 0.8–5.3)
LYMPHOCYTES NFR BLD AUTO: 29 %
MCH RBC QN AUTO: 30.8 PG (ref 26.5–33)
MCHC RBC AUTO-ENTMCNC: 34 G/DL (ref 31.5–36.5)
MCV RBC AUTO: 90 FL (ref 78–100)
MONOCYTES # BLD AUTO: 0.8 10E3/UL (ref 0–1.3)
MONOCYTES NFR BLD AUTO: 9 %
NEUTROPHILS # BLD AUTO: 5.7 10E3/UL (ref 1.6–8.3)
NEUTROPHILS NFR BLD AUTO: 61 %
PLATELET # BLD AUTO: 214 10E3/UL (ref 150–450)
POTASSIUM SERPL-SCNC: 4.6 MMOL/L (ref 3.4–5.3)
PROT SERPL-MCNC: 7.1 G/DL (ref 6.4–8.3)
RBC # BLD AUTO: 4.65 10E6/UL (ref 4.4–5.9)
SODIUM SERPL-SCNC: 141 MMOL/L (ref 135–145)
TSH SERPL DL<=0.005 MIU/L-ACNC: 1.56 UIU/ML (ref 0.3–4.2)
WBC # BLD AUTO: 9.3 10E3/UL (ref 4–11)

## 2024-10-21 PROCEDURE — 85025 COMPLETE CBC W/AUTO DIFF WBC: CPT | Performed by: FAMILY MEDICINE

## 2024-10-21 PROCEDURE — 99214 OFFICE O/P EST MOD 30 MIN: CPT | Performed by: FAMILY MEDICINE

## 2024-10-21 PROCEDURE — 80053 COMPREHEN METABOLIC PANEL: CPT | Performed by: FAMILY MEDICINE

## 2024-10-21 PROCEDURE — 84443 ASSAY THYROID STIM HORMONE: CPT | Performed by: FAMILY MEDICINE

## 2024-10-21 PROCEDURE — 36415 COLL VENOUS BLD VENIPUNCTURE: CPT | Performed by: FAMILY MEDICINE

## 2024-10-21 RX ORDER — HYDROXYZINE HYDROCHLORIDE 25 MG/1
25 TABLET, FILM COATED ORAL 3 TIMES DAILY PRN
Qty: 60 TABLET | Refills: 1 | Status: SHIPPED | OUTPATIENT
Start: 2024-10-21

## 2024-10-21 ASSESSMENT — ENCOUNTER SYMPTOMS: WHEEZING: 1

## 2024-10-21 ASSESSMENT — PAIN SCALES - GENERAL: PAINLEVEL: MILD PAIN (3)

## 2024-10-21 NOTE — PROGRESS NOTES
"  Assessment & Plan     Itching  Differentials discussed in detail including but not limited to allergic, dermatological and metabolic etiology.  CBC, CMP, TSH ordered.  Hydroxyzine prescribed and dermatology referral placed  - CBC with platelets and differential; Future  - Comprehensive metabolic panel (BMP + Alb, Alk Phos, ALT, AST, Total. Bili, TP); Future  - Adult Dermatology  Referral; Future  - hydrOXYzine HCl (ATARAX) 25 MG tablet; Take 1 tablet (25 mg) by mouth 3 times daily as needed for itching.  - TSH with free T4 reflex; Future  - CBC with platelets and differential  - Comprehensive metabolic panel (BMP + Alb, Alk Phos, ALT, AST, Total. Bili, TP)  - TSH with free T4 reflex      Nicotine/Tobacco Cessation  He reports that he has been smoking cigarettes. He has never used smokeless tobacco.  Nicotine/Tobacco Cessation Plan  Information offered: Patient not interested at this time      BMI  Estimated body mass index is 36.21 kg/m  as calculated from the following:    Height as of this encounter: 1.803 m (5' 11\").    Weight as of this encounter: 117.8 kg (259 lb 9.6 oz).   Weight management plan: Discussed healthy diet and exercise guidelines      Abbi Jackson is a 33 year old, presenting for the following health issues:  Allergies        10/21/2024    12:37 PM   Additional Questions   Roomed by Lilia DE LA CRUZ LPN   Accompanied by self     History of Present Illness       Reason for visit:  Getting bad itching that goes away with allergy meds.  Symptom onset:  More than a month  Symptom intensity:  Severe  Symptom progression:  Staying the same  Had these symptoms before:  Yes  Has tried/received treatment for these symptoms:  No  What makes it worse:  No  What makes it better:  Over the counter allergy medication   He is taking medications regularly.     Itching and burning sensation. No new soaps, laundry detergent.       Review of Systems  Constitutional, neuro, ENT, endocrine, pulmonary, " "cardiac, gastrointestinal, genitourinary, musculoskeletal, integument and psychiatric systems are negative, except as otherwise noted.      Objective    /70   Pulse 112   Temp 98.5  F (36.9  C) (Tympanic)   Resp 20   Ht 1.803 m (5' 11\")   Wt 117.8 kg (259 lb 9.6 oz)   SpO2 97%   BMI 36.21 kg/m    Body mass index is 36.21 kg/m .  Physical Exam   GENERAL: alert and no distress  EYES: Eyes grossly normal to inspection, PERRL and conjunctivae and sclerae normal  HENT: normal cephalic/atraumatic, nose and mouth without ulcers or lesions, oropharynx clear, and oral mucous membranes moist  NECK: no adenopathy, no asymmetry, masses, or scars  RESP: lungs clear to auscultation - no rales, rhonchi or wheezes  CV: regular rate and rhythm, normal S1 S2, no S3 or S4, no murmur, click or rub, no peripheral edema  ABDOMEN: soft, nontender, no hepatosplenomegaly, no masses  MS: no gross musculoskeletal defects noted, no edema  SKIN: no suspicious lesions or rashes  NEURO: Normal strength and tone, mentation intact and speech normal  PSYCH: mentation appears normal, affect normal/bright      Signed Electronically by: Lorne Greenwood MD    "

## 2024-10-22 ENCOUNTER — TELEPHONE (OUTPATIENT)
Dept: DERMATOLOGY | Facility: CLINIC | Age: 34
End: 2024-10-22
Payer: COMMERCIAL

## 2024-10-22 NOTE — TELEPHONE ENCOUNTER
This encounter is being sent to inform the clinic that this patient has a referral from [facility] Greenwood MD [facility] the diagnoses of Pruritis; Itching and has requested that this patient be seen within Priority: 1-2 Weeks and/or with Priority: 1-2 Weeks.  Based on the availability of our provider(s), we are unable to accommodate this request.    Were all sites offered this patient?  Yes  [place] location only please in [place] - as closest to Pt's home and the rest too far - Thanks!  Does scheduling algorithm request to schedule next available?  Patient has been scheduled for the first available opening with Tami Choi PA-C in [place] on 12/10/2024.  We have informed the patient that the clinic will review their referral and reach out if a sooner appointment is medically necessary.

## 2024-10-29 ENCOUNTER — OFFICE VISIT (OUTPATIENT)
Dept: DERMATOLOGY | Facility: CLINIC | Age: 34
End: 2024-10-29
Payer: COMMERCIAL

## 2024-10-29 DIAGNOSIS — L29.9 ITCHING: ICD-10-CM

## 2024-10-29 DIAGNOSIS — L50.3 DERMATOGRAPHIA: Primary | ICD-10-CM

## 2024-10-29 PROCEDURE — 99243 OFF/OP CNSLTJ NEW/EST LOW 30: CPT | Performed by: DERMATOLOGY

## 2024-10-29 NOTE — LETTER
10/29/2024      Hussain Warren  [address]  [facility] [address]      Dear Colleague,    Thank you for referring your patient, Hussain Warren, to the [facility]. Please see a copy of my visit note below.    Hussain Warren , a 33 year old year old male patient, I was asked to see by Dr. Greenwood for itching.  Using imani spring soap.  Pt has no other skin complaints today.  Remainder of the HPI, Meds, PMH, Allergies, FH, and SH was reviewed in chart.      Past Medical History:   Diagnosis Date     Back pain      Depressive disorder      Other acne        Past Surgical History:   Procedure Laterality Date     ORTHOPEDIC SURGERY      boxer's fracture at age 15        Family History   Problem Relation Age of Onset     Hypertension Father         gout     Heart Disease Father      Hypertension Paternal Grandfather      Diabetes Paternal Grandmother         type 2, Alzheimers     Diabetes Paternal Uncle      Hypertension Paternal Uncle      Hypertension Paternal Aunt      Hypertension Paternal Aunt      Breast Cancer Paternal Aunt        Social History     Socioeconomic History     Marital status:      Spouse name: Not on file     Number of children: Not on file     Years of education: Not on file     Highest education level: Not on file   Occupational History     Not on file   Tobacco Use     Smoking status: Every Day     Current packs/day: 0.00     Types: Cigarettes     Last attempt to quit: 2018     Years since quittin.1     Smokeless tobacco: Never   Vaping Use     Vaping status: Never Used   Substance and Sexual Activity     Alcohol use: Yes     Comment: Occ     Drug use: No     Sexual activity: Yes   Other Topics Concern      Service No     Blood Transfusions No     Caffeine Concern No     Comment: none during wrestling season     Occupational Exposure No     Hobby Hazards Yes     Comment: high school wrestler     Sleep Concern No     Stress Concern No     Weight  Concern No     Special Diet Yes     Comment: wrestler     Back Care No     Exercise Not Asked     Bike Helmet Yes     Seat Belt Yes     Self-Exams Not Asked     Parent/sibling w/ CABG, MI or angioplasty before 65F 55M? No   Social History Narrative     Not on file     Social Drivers of Health     Financial Resource Strain: Not on file   Food Insecurity: Not on file   Transportation Needs: Not on file   Physical Activity: Not on file   Stress: Not on file   Social Connections: Not on file   Interpersonal Safety: Low Risk  (10/21/2024)    Interpersonal Safety      Do you feel physically and emotionally safe where you currently live?: Yes      Within the past 12 months, have you been hit, slapped, kicked or otherwise physically hurt by someone?: No      Within the past 12 months, have you been humiliated or emotionally abused in other ways by your partner or ex-partner?: No   Housing Stability: Not on file       Outpatient Encounter Medications as of 10/29/2024   Medication Sig Dispense Refill     Ascorbic Acid (VITAMIN C) 500 MG CAPS Take 2 capsules by mouth daily       multivitamin w/minerals (THERA-VIT-M) tablet Take 1 tablet by mouth daily       vitamin D3 (CHOLECALCIFEROL) 50 mcg (2000 units) tablet Take 1 tablet by mouth daily       albuterol (PROAIR HFA/PROVENTIL HFA/VENTOLIN HFA) 108 (90 Base) MCG/ACT inhaler Inhale 2 puffs into the lungs 4 times daily for 7 days. 18 g 0     hydrOXYzine HCl (ATARAX) 25 MG tablet Take 1 tablet (25 mg) by mouth 3 times daily as needed for itching. (Patient not taking: Reported on 10/29/2024) 60 tablet 1     No facility-administered encounter medications on file as of 10/29/2024.             Review Of Systems  Skin: As above  Eyes: negative  Ears/Nose/Throat: negative  Respiratory: No shortness of breath, dyspnea on exertion, cough, or hemoptysis  Cardiovascular: negative  Gastrointestinal: negative  Genitourinary: negative  Musculoskeletal: negative  Neurologic:  negative  Psychiatric: negative  Hematologic/Lymphatic/Immunologic: negative  Endocrine: negative      O:   NAD, WDWN, Alert & Oriented, Mood & Affect wnl, Vitals stable   General appearance kvng ii   Vitals stable   Alert, oriented and in no acute distress   Dermatographia on trunk and ext       Eyes: Conjunctivae/lids:Normal     ENT: Lips, mucosa: normal    MSK:Normal    Cardiovascular: peripheral edema none    Pulm: Breathing Normal    Neuro/Psych: Orientation:Normal; Mood/Affect:Normal      A/P:  Dermatographia   Pathophysiology discussed with pateint   Skin care discussed with patient   Xyzal two in am  Zyrtec two bedtime  Return to clinic 4 weeks  It was a pleasure speaking to Hussain Warren today.  Previous clinic  notes and pertinent laboratory tests were reviewed prior to Hussain Warren's visit.  Skin care regimen reviewed with patient: Eliminate harsh soaps, i.e. Dial, zest, irsih spring; Mild soaps such as Cetaphil or Dove sensitive skin, avoid hot or cold showers, aggressive use of emollients including vanicream, cetaphil or cerave discussed with patient.        Again, thank you for allowing me to participate in the care of your patient.        Sincerely,        Phil Brown MD

## 2024-10-29 NOTE — PATIENT INSTRUCTIONS
Morning:  Take 2, xyzal       Evening:  Take 2, 10 mg zyrtec. You can get generic, they work just as well as name brand.    --  stop using Kuwaiti spring soap     Proper skin care from Dr. Brown- [facility]                  Eliminate harsh soaps, i.e. Dial, Zest, Greek Spring;             Use mild soaps such as Cetaphil or Dove Sensitive Skin             Avoid hot or cold showers             After showering, lightly dry off.              Aggressive use of a moisturizer (including Vanicream, Cetaphil, Aquaphor or Cerave)   We recommend using a tub that needs to be scooped out, not a pump. This has more of an oil base. It will hold moisture in your skin much better than a water base moisturizer. The ones recommended are non- pore clogging.                  If you have any questions call [phone] and follow the prompts to Dr. Brown's office.

## 2024-10-29 NOTE — PROGRESS NOTES
Hussain Warren , a 33 year old year old male patient, I was asked to see by Dr. Greenwood for itching.  Using Thai spring soap.  Pt has no other skin complaints today.  Remainder of the HPI, Meds, PMH, Allergies, FH, and SH was reviewed in chart.      Past Medical History:   Diagnosis Date    Back pain     Depressive disorder     Other acne        Past Surgical History:   Procedure Laterality Date    ORTHOPEDIC SURGERY      boxer's fracture at age 15        Family History   Problem Relation Age of Onset    Hypertension Father         gout    Heart Disease Father     Hypertension Paternal Grandfather     Diabetes Paternal Grandmother         type 2, Alzheimers    Diabetes Paternal Uncle     Hypertension Paternal Uncle     Hypertension Paternal Aunt     Hypertension Paternal Aunt     Breast Cancer Paternal Aunt        Social History     Socioeconomic History    Marital status:      Spouse name: Not on file    Number of children: Not on file    Years of education: Not on file    Highest education level: Not on file   Occupational History    Not on file   Tobacco Use    Smoking status: Every Day     Current packs/day: 0.00     Types: Cigarettes     Last attempt to quit: 2018     Years since quittin.1    Smokeless tobacco: Never   Vaping Use    Vaping status: Never Used   Substance and Sexual Activity    Alcohol use: Yes     Comment: Occ    Drug use: No    Sexual activity: Yes   Other Topics Concern     Service No    Blood Transfusions No    Caffeine Concern No     Comment: none during wrestling season    Occupational Exposure No    Hobby Hazards Yes     Comment: high school wrestler    Sleep Concern No    Stress Concern No    Weight Concern No    Special Diet Yes     Comment: wrestler    Back Care No    Exercise Not Asked    Bike Helmet Yes    Seat Belt Yes    Self-Exams Not Asked    Parent/sibling w/ CABG, MI or angioplasty before 65F 55M? No   Social History Narrative    Not on file     Social  Drivers of Health     Financial Resource Strain: Not on file   Food Insecurity: Not on file   Transportation Needs: Not on file   Physical Activity: Not on file   Stress: Not on file   Social Connections: Not on file   Interpersonal Safety: Low Risk  (10/21/2024)    Interpersonal Safety     Do you feel physically and emotionally safe where you currently live?: Yes     Within the past 12 months, have you been hit, slapped, kicked or otherwise physically hurt by someone?: No     Within the past 12 months, have you been humiliated or emotionally abused in other ways by your partner or ex-partner?: No   Housing Stability: Not on file       Outpatient Encounter Medications as of 10/29/2024   Medication Sig Dispense Refill    Ascorbic Acid (VITAMIN C) 500 MG CAPS Take 2 capsules by mouth daily      multivitamin w/minerals (THERA-VIT-M) tablet Take 1 tablet by mouth daily      vitamin D3 (CHOLECALCIFEROL) 50 mcg (2000 units) tablet Take 1 tablet by mouth daily      albuterol (PROAIR HFA/PROVENTIL HFA/VENTOLIN HFA) 108 (90 Base) MCG/ACT inhaler Inhale 2 puffs into the lungs 4 times daily for 7 days. 18 g 0    hydrOXYzine HCl (ATARAX) 25 MG tablet Take 1 tablet (25 mg) by mouth 3 times daily as needed for itching. (Patient not taking: Reported on 10/29/2024) 60 tablet 1     No facility-administered encounter medications on file as of 10/29/2024.             Review Of Systems  Skin: As above  Eyes: negative  Ears/Nose/Throat: negative  Respiratory: No shortness of breath, dyspnea on exertion, cough, or hemoptysis  Cardiovascular: negative  Gastrointestinal: negative  Genitourinary: negative  Musculoskeletal: negative  Neurologic: negative  Psychiatric: negative  Hematologic/Lymphatic/Immunologic: negative  Endocrine: negative      O:   NAD, WDWN, Alert & Oriented, Mood & Affect wnl, Vitals stable   General appearance kvng ii   Vitals stable   Alert, oriented and in no acute distress   Dermatographia on trunk and ext        Eyes: Conjunctivae/lids:Normal     ENT: Lips, mucosa: normal    MSK:Normal    Cardiovascular: peripheral edema none    Pulm: Breathing Normal    Neuro/Psych: Orientation:Normal; Mood/Affect:Normal      A/P:  Dermatographia   Pathophysiology discussed with pateint   Skin care discussed with patient   Xyzal two in am  Zyrtec two bedtime  Return to clinic 4 weeks  It was a pleasure speaking to Hussain Warren today.  Previous clinic  notes and pertinent laboratory tests were reviewed prior to Hussain Warren's visit.  Skin care regimen reviewed with patient: Eliminate harsh soaps, i.e. Dial, zest, irsih spring; Mild soaps such as Cetaphil or Dove sensitive skin, avoid hot or cold showers, aggressive use of emollients including vanicream, cetaphil or cerave discussed with patient.

## 2024-11-07 ENCOUNTER — ANCILLARY PROCEDURE (OUTPATIENT)
Dept: GENERAL RADIOLOGY | Facility: CLINIC | Age: 34
End: 2024-11-07
Payer: COMMERCIAL

## 2024-11-07 ENCOUNTER — OFFICE VISIT (OUTPATIENT)
Dept: URGENT CARE | Facility: URGENT CARE | Age: 34
End: 2024-11-07
Payer: COMMERCIAL

## 2024-11-07 VITALS
WEIGHT: 258 LBS | DIASTOLIC BLOOD PRESSURE: 72 MMHG | SYSTOLIC BLOOD PRESSURE: 126 MMHG | RESPIRATION RATE: 16 BRPM | BODY MASS INDEX: 35.98 KG/M2 | TEMPERATURE: 97.8 F | OXYGEN SATURATION: 97 % | HEART RATE: 90 BPM

## 2024-11-07 DIAGNOSIS — R05.1 ACUTE COUGH: ICD-10-CM

## 2024-11-07 DIAGNOSIS — J02.9 SORE THROAT: ICD-10-CM

## 2024-11-07 DIAGNOSIS — J18.9 PNEUMONIA DUE TO INFECTIOUS ORGANISM, UNSPECIFIED LATERALITY, UNSPECIFIED PART OF LUNG: Primary | ICD-10-CM

## 2024-11-07 LAB
BASOPHILS # BLD AUTO: 0 10E3/UL (ref 0–0.2)
BASOPHILS NFR BLD AUTO: 0 %
DEPRECATED S PYO AG THROAT QL EIA: NEGATIVE
EOSINOPHIL # BLD AUTO: 0.1 10E3/UL (ref 0–0.7)
EOSINOPHIL NFR BLD AUTO: 1 %
ERYTHROCYTE [DISTWIDTH] IN BLOOD BY AUTOMATED COUNT: 13 % (ref 10–15)
FLUAV AG SPEC QL IA: NEGATIVE
FLUBV AG SPEC QL IA: NEGATIVE
GROUP A STREP BY PCR: NOT DETECTED
HCT VFR BLD AUTO: 44 % (ref 40–53)
HGB BLD-MCNC: 14.7 G/DL (ref 13.3–17.7)
IMM GRANULOCYTES # BLD: 0 10E3/UL
IMM GRANULOCYTES NFR BLD: 0 %
LYMPHOCYTES # BLD AUTO: 1.6 10E3/UL (ref 0.8–5.3)
LYMPHOCYTES NFR BLD AUTO: 17 %
MCH RBC QN AUTO: 29.9 PG (ref 26.5–33)
MCHC RBC AUTO-ENTMCNC: 33.4 G/DL (ref 31.5–36.5)
MCV RBC AUTO: 89 FL (ref 78–100)
MONOCYTES # BLD AUTO: 1.1 10E3/UL (ref 0–1.3)
MONOCYTES NFR BLD AUTO: 11 %
MONOCYTES NFR BLD AUTO: NEGATIVE %
NEUTROPHILS # BLD AUTO: 6.4 10E3/UL (ref 1.6–8.3)
NEUTROPHILS NFR BLD AUTO: 70 %
PLATELET # BLD AUTO: 204 10E3/UL (ref 150–450)
RBC # BLD AUTO: 4.92 10E6/UL (ref 4.4–5.9)
WBC # BLD AUTO: 9.2 10E3/UL (ref 4–11)

## 2024-11-07 PROCEDURE — 86308 HETEROPHILE ANTIBODY SCREEN: CPT

## 2024-11-07 PROCEDURE — 99214 OFFICE O/P EST MOD 30 MIN: CPT

## 2024-11-07 PROCEDURE — 87804 INFLUENZA ASSAY W/OPTIC: CPT

## 2024-11-07 PROCEDURE — 36415 COLL VENOUS BLD VENIPUNCTURE: CPT

## 2024-11-07 PROCEDURE — 71046 X-RAY EXAM CHEST 2 VIEWS: CPT | Mod: TC | Performed by: RADIOLOGY

## 2024-11-07 PROCEDURE — 87651 STREP A DNA AMP PROBE: CPT

## 2024-11-07 PROCEDURE — 85025 COMPLETE CBC W/AUTO DIFF WBC: CPT

## 2024-11-07 RX ORDER — FLUTICASONE PROPIONATE 50 MCG
SPRAY, SUSPENSION (ML) NASAL
COMMUNITY

## 2024-11-07 RX ORDER — AZITHROMYCIN 250 MG/1
TABLET, FILM COATED ORAL
Qty: 6 TABLET | Refills: 0 | Status: SHIPPED | OUTPATIENT
Start: 2024-11-07 | End: 2024-11-12

## 2024-11-07 NOTE — PROGRESS NOTES
URGENT CARE  Assessment & Plan   Assessment:   Hussain Warren is a 33 year old male who's clinical presentation today is consistent with:   1. Pneumonia, atypical   - XR Chest 2 Views; Future  - CBC with platelets and differential;   - amoxicillin-clavulanate (AUGMENTIN) 875-125 MG tablet;   - azithromycin (ZITHROMAX) 250 MG tablet;  - ED To Primary Care Referral; Future  Plan:  Will treat patient's suspected atypical pneumonia today w/ antibiotics     Given atypical presentation of consolidation/mass on right and opacity on left would like him to follow up in a few weeks for repeat chest xray to assess for resolution vs neoplasm, patient is a smoker, patient states an understanding (patient's cbc was reassuring)   We also discussed following up sooner if symptoms worsen in the next 48 hrs they should  present to the ED. Informed patient worsening symptoms would include: dyspnea, lightheadedness, cyanosis, tachypnea, hemoptysis, stiff neck, lethargy, chest pain or new onset of fever/chills.     No alarm signs or symptoms present   Differential Diagnoses for this patient's CC include: Bacterial, viral or atypical etiology of lung consolidation / infiltrate, Covid, influenza, bronchitis, Asthma exacerbation, COPD exacerbation, Lung malignancy, bronchiectasis exacerbation, Tuberculosis, empyema     2. Sore throat  - Streptococcus A Rapid Screen w/Reflex to PCR - Clinic Collect  - Mononucleosis screen; Future  - Influenza A & B Antigen - Clinic Collect    Patient  is agreeable to treatment plan and state they will follow-up if symptoms do not improve and/or if symptoms worsen   see patient's AVS 'monitor for' section for specific patient instructions given and discussed regarding what to watch for and when to follow up    ANAYA Velasco [facility] URGENT C[facility] [place]      ______________________________________________________________________      Subjective     HPI: Hussain Warren  is a 33 year old   male who presents today for evaluation the following concerns:   Patient endorses a cough today which they state they have had for 6 days    Patient reports he also has a bad sore throat, patient states his cough as mucus production, patient does endorse being a smoker   At home covid negative    Patient denies any current fevers, wheezing, shortness of breath, difficulty breathing   additionally patient denies a history of allergies, asthma or any other past medical history of breathing conditions     Review of Systems:  Pertinent review of systems as reflected in HPI, otherwise negative.     Objective    Physical Exam:  Vitals:    11/07/24 1118   BP: 126/72   Pulse: 90   Resp: 16   Temp: 97.8  F (36.6  C)   TempSrc: Tympanic   SpO2: 97%   Weight: 117 kg (258 lb)     General: Alert and oriented, no acute distress, Vital signs reviewed: afebrile,  normotensive   Psy/mental status: Cooperative, nonanxious  SKIN: Intact, no rashes  EYES: EOMs intact, PERRLA bilaterally   Conjunctiva: Clear bilaterally, no injection or erythema present  EARS: TMs intact, translucent gray in color with normal landmarks present no erythema  or bulging tympanic membrane   Canals are without swelling, however have a mild amount of cerumen, no impaction  NOSE:  mucosa moist               No frontal or maxillary sinus tenderness present bilaterally  MOUTH/THROAT: lips, tongue, & oral mucosa appear normal upon inspection                Posterior oropharynx is erythematous but without exudate, lesions or tonsillar  Edema, no dysphonia, no unilateral tonsillar edema, no uvular deviation,   no signs of peritonsillar abscess  NECK: supple, has full range of motion with no meningeal signs              No lymphadenopathy present  LUNG: normal work of breathing, good respiratory effort without retractions, good air  movement, non labored, inspection reveals normal chest expansion w/  inspiration            Lung sounds are coarse in bases to  auscultation bilaterally,            No rales/rhonic/crackles wheezing noted           No cough noted as harsh, frequent but non productive      LABS:   Results for orders placed or performed in visit on 11/07/24   XR Chest 2 Views     Status: None    Narrative    XR CHEST 2 VIEWS 11/7/2024 12:11 PM    HISTORY: rule out pneumonia; Acute cough    COMPARISON: 2/19/2021      Impression    IMPRESSION: Right perihilar mass and/or consolidation. Additional mild  patchy opacities in the left lung. Findings may be due to infection.  If the patient has typical signs of infection, follow-up chest x-rays  recommended 6-8 weeks after appropriate therapy to ensure resolution  and exclude neoplasm. If symptoms are atypical, further evaluation  with a contrast-enhanced CT of the chest is recommended.  No pleural effusions. Normal heart size and pulmonary vascularity.    ILIANA CULVER MD         SYSTEM ID:  [id number]   Results for orders placed or performed in visit on 11/07/24   Mononucleosis screen     Status: Normal   Result Value Ref Range    Mononucleosis Screen Negative Negative   CBC with platelets and differential     Status: None   Result Value Ref Range    WBC Count 9.2 4.0 - 11.0 10e3/uL    RBC Count 4.92 4.40 - 5.90 10e6/uL    Hemoglobin 14.7 13.3 - 17.7 g/dL    Hematocrit 44.0 40.0 - 53.0 %    MCV 89 78 - 100 fL    MCH 29.9 26.5 - 33.0 pg    MCHC 33.4 31.5 - 36.5 g/dL    RDW 13.0 10.0 - 15.0 %    Platelet Count 204 150 - 450 10e3/uL    % Neutrophils 70 %    % Lymphocytes 17 %    % Monocytes 11 %    % Eosinophils 1 %    % Basophils 0 %    % Immature Granulocytes 0 %    Absolute Neutrophils 6.4 1.6 - 8.3 10e3/uL    Absolute Lymphocytes 1.6 0.8 - 5.3 10e3/uL    Absolute Monocytes 1.1 0.0 - 1.3 10e3/uL    Absolute Eosinophils 0.1 0.0 - 0.7 10e3/uL    Absolute Basophils 0.0 0.0 - 0.2 10e3/uL    Absolute Immature Granulocytes 0.0 <=0.4 10e3/uL   Streptococcus A Rapid Screen w/Reflex to PCR - Clinic Collect     Status: Normal     Specimen: Throat; Swab   Result Value Ref Range    Group A Strep antigen Negative Negative   Influenza A & B Antigen - Clinic Collect     Status: Normal    Specimen: Nose; Swab   Result Value Ref Range    Influenza A antigen Negative Negative    Influenza B antigen Negative Negative    Narrative    Test results must be correlated with clinical data. If necessary, results should be confirmed by a molecular assay or viral culture.   CBC with platelets and differential     Status: None    Narrative    The following orders were created for panel order CBC with platelets and differential.  Procedure                               Abnormality         Status                     ---------                               -----------         ------                     CBC with platelets and d...[[id number]]                      Final result                 Please view results for these tests on the individual orders.       Imaging:   All images were personally read by this provider (myself).   Per my independent interpretation the xray shows mass vs consolidation in the right perihilar space and mild patchy opacity in left lung, both upper and lower    ______________________________________________________________________    I explained my diagnostic considerations and recommendations to the patient, who voiced understanding and agreement with the treatment plan.   All questions were answered.   We discussed potential side effects, risks and benefits of any prescribed or recommended therapies, as well as expectations for response to treatments.  Please see AVS for any patient instructions & handouts given.   Patient was advised to contact the Nurse Care Line, their Primary Care provider, Urgent Care, or the Emergency Department if there are new or worsening symptoms, or call 911 for emergencies.

## 2024-11-07 NOTE — PATIENT INSTRUCTIONS
Diagnosis: pneumonia suspected    Mass vs consolidation in right upper lobe     Today we did:  Chest xray - mass and consolidation in right upper lobe    Hazy opacity in left side   Labs - no elevated wbc count   Strep negative       Plan:   Antibiotics today   Ibuprofen and tylenol as needed for pain, fevers, and body aches   Fluids and rest   Follow up with your PCP in the 4-6 weeks for repeat chest xray   To reassess if the mass is gone, check for resolution     Monitor for:   Lips or skin looks blue, purple, or gray  Feeling dizzy or faint  Trouble breathing or wheezing, Shortness of breath gets worse   Rapid breathing   Coughing up blood  Fever of 101 F or higher,  Shaking chills  Cough with phlegm that doesn't get better, or get worse  Shortness of breath with activities  Weakness, dizziness, or fainting that gets worse  stiff neck, headache and extreme lethargy   Chest pain with breathing or coughing  Symptoms that get worse or not improving       Pneumonia (Adult)  Pneumonia is an infection deep in the lungs.   It is in the small air sacs (alveoli).   It may be caused by a virus, fungus, or bacteria.   Pneumonia caused by bacteria is often treated with an antibiotic.   Severe cases may need to be treated in the hospital.   Milder cases can be treated at home.   Pneumonia symptoms are a lot like flu symptoms  They include fever, cough (dry or with phlegm), headache, muscle weakness, and pain.   These symptoms often get worse in the first 2 days.   But they often start to get better in the first week of treatment

## 2024-11-26 ENCOUNTER — OFFICE VISIT (OUTPATIENT)
Dept: DERMATOLOGY | Facility: CLINIC | Age: 34
End: 2024-11-26
Payer: COMMERCIAL

## 2024-11-26 DIAGNOSIS — L50.3 DERMATOGRAPHIA: Primary | ICD-10-CM

## 2024-11-26 PROCEDURE — 99442 PR PHYSICIAN TELEPHONE EVALUATION 11-20 MIN: CPT | Performed by: DERMATOLOGY

## 2024-11-26 NOTE — LETTER
"11/26/2024      Hussain Warren  [address]  [facility] [address]      Dear Colleague,    Thank you for referring your patient, Hussain Warren, to the [facility]. Please see a copy of my visit note below.        Hussain Warren is a 33 year old male who is being evaluated via a phone  visit.      The patient has been notified of following:     \"This phone  visit will be conducted via a call between you and your physician/provider. We have found that certain health care needs can be provided without the need for an in-person physical exam.  This service lets us provide the care you need with a video conversation.  If a prescription is necessary we can send it directly to your pharmacy.  If lab work is needed we can place an order for that and you can then stop by our lab to have the test done at a later time.    Phone visits are billed at different rates depending on your insurance coverage.  Please reach out to your insurance provider with any questions.    If during the course of the call the physician/provider feels a phone visit is not appropriate, you will not be charged for this service.\"    Patient has given verbal consent for phone visit? Yes    How would you like to obtain your AVS? MyChart    Hussain Warren is a 33 year old year old male patient here today for follow up dermatographia and itching all clear on xyzal and zyrtec.  Patient has no other skin complaints today.  Remainder of the HPI, Meds, PMH, Allergies, FH, and SH was reviewed in chart.      Past Medical History:   Diagnosis Date     Back pain      Depressive disorder      Other acne        Past Surgical History:   Procedure Laterality Date     ORTHOPEDIC SURGERY      boxer's fracture at age 15        Family History   Problem Relation Age of Onset     Hypertension Father         gout     Heart Disease Father      Hypertension Paternal Grandfather      Diabetes Paternal Grandmother         type 2, Alzheimers     " Diabetes Paternal Uncle      Hypertension Paternal Uncle      Hypertension Paternal Aunt      Hypertension Paternal Aunt      Breast Cancer Paternal Aunt        Social History     Socioeconomic History     Marital status:      Spouse name: Not on file     Number of children: Not on file     Years of education: Not on file     Highest education level: Not on file   Occupational History     Not on file   Tobacco Use     Smoking status: Every Day     Current packs/day: 0.00     Types: Cigarettes     Last attempt to quit: 2018     Years since quittin.2     Smokeless tobacco: Never   Vaping Use     Vaping status: Never Used   Substance and Sexual Activity     Alcohol use: Yes     Comment: Occ     Drug use: No     Sexual activity: Yes   Other Topics Concern      Service No     Blood Transfusions No     Caffeine Concern No     Comment: none during wrestling season     Occupational Exposure No     Hobby Hazards Yes     Comment: high school wrestler     Sleep Concern No     Stress Concern No     Weight Concern No     Special Diet Yes     Comment: wrestler     Back Care No     Exercise Not Asked     Bike Helmet Yes     Seat Belt Yes     Self-Exams Not Asked     Parent/sibling w/ CABG, MI or angioplasty before 65F 55M? No   Social History Narrative     Not on file     Social Drivers of Health     Financial Resource Strain: Not on file   Food Insecurity: Not on file   Transportation Needs: Not on file   Physical Activity: Not on file   Stress: Not on file   Social Connections: Not on file   Interpersonal Safety: Low Risk  (10/21/2024)    Interpersonal Safety      Do you feel physically and emotionally safe where you currently live?: Yes      Within the past 12 months, have you been hit, slapped, kicked or otherwise physically hurt by someone?: No      Within the past 12 months, have you been humiliated or emotionally abused in other ways by your partner or ex-partner?: No   Housing Stability: Not on file        Outpatient Encounter Medications as of 11/26/2024   Medication Sig Dispense Refill     albuterol (PROAIR HFA/PROVENTIL HFA/VENTOLIN HFA) 108 (90 Base) MCG/ACT inhaler Inhale 2 puffs into the lungs 4 times daily for 7 days. 18 g 0     Ascorbic Acid (VITAMIN C) 500 MG CAPS Take 2 capsules by mouth daily       fluticasone (FLONASE ALLERGY RELIEF) 50 MCG/ACT nasal spray 1 spray in each nostril Nasally Once a day       hydrOXYzine HCl (ATARAX) 25 MG tablet Take 1 tablet (25 mg) by mouth 3 times daily as needed for itching. (Patient not taking: Reported on 10/29/2024) 60 tablet 1     multivitamin w/minerals (THERA-VIT-M) tablet Take 1 tablet by mouth daily       vitamin D3 (CHOLECALCIFEROL) 50 mcg (2000 units) tablet Take 1 tablet by mouth daily       No facility-administered encounter medications on file as of 11/26/2024.             Review Of Systems  Skin: As above  Eyes: negative  Ears/Nose/Throat: negative  Respiratory: No shortness of breath, dyspnea on exertion, cough, or hemoptysis  Cardiovascular: negative  Gastrointestinal: negative  Genitourinary: negative  Musculoskeletal: negative  Neurologic: negative  Psychiatric: negative  Hematologic/Lymphatic/Immunologic: negative  Endocrine: negative      O:   Alert & Orientedx3, Mood & Affect wnl,    General appearance normal   Alert, oriented and in no acute distress    Doing well no issues     Pulm: Breathing Normal, talking in normal sentences, no shortness of breath during conversation    Neuro/Psych: Orientation:Alert and Orientedx3 ; Mood/Affect:normal ; no anxiety or depression     A/P:  1.dermatographia and itching clear  Drop one zyrtec for 4 weeks if still clear drop on xyzal   Cont skin care  Return to clinic prn   It was a pleasure speaking to Hussain Warren today.  Previous clinic  notes and pertinent laboratory tests were reviewed prior to Hussain Warren's visit.  Skin care regimen reviewed with patient: Eliminate harsh soaps, i.e. Dial, zest,  irsih spring; Mild soaps such as Cetaphil or Dove sensitive skin, avoid hot or cold showers, aggressive use of emollients including vanicream, cetaphil or cerave discussed with patient.      Teledermatology information:  - Location of patient: home  - Location of teledermatologist: [facility]   - Reason teledermatology is appropriate: of National Emergency Regarding Coronavirus disease (COVID 19) Outbreak  - The patient's condition can safely be assessed using telemedicine: yes  - Method of transmission: store and forward teledermatology  - In-person dermatology visit recommendation: no  - Service start time:1015am/pm  - Service end time:1028am/pm  - Date of report: 11/26/24      Again, thank you for allowing me to participate in the care of your patient.        Sincerely,        Phil Brown MD

## 2024-11-26 NOTE — PROGRESS NOTES
"    Hussain Warren is a 33 year old male who is being evaluated via a phone  visit.      The patient has been notified of following:     \"This phone  visit will be conducted via a call between you and your physician/provider. We have found that certain health care needs can be provided without the need for an in-person physical exam.  This service lets us provide the care you need with a video conversation.  If a prescription is necessary we can send it directly to your pharmacy.  If lab work is needed we can place an order for that and you can then stop by our lab to have the test done at a later time.    Phone visits are billed at different rates depending on your insurance coverage.  Please reach out to your insurance provider with any questions.    If during the course of the call the physician/provider feels a phone visit is not appropriate, you will not be charged for this service.\"    Patient has given verbal consent for phone visit? Yes    How would you like to obtain your AVS? Molly    Hussain Warren is a 33 year old year old male patient here today for follow up dermatographia and itching all clear on xyzal and zyrtec.  Patient has no other skin complaints today.  Remainder of the HPI, Meds, PMH, Allergies, FH, and SH was reviewed in chart.      Past Medical History:   Diagnosis Date    Back pain     Depressive disorder     Other acne        Past Surgical History:   Procedure Laterality Date    ORTHOPEDIC SURGERY      boxer's fracture at age 15        Family History   Problem Relation Age of Onset    Hypertension Father         gout    Heart Disease Father     Hypertension Paternal Grandfather     Diabetes Paternal Grandmother         type 2, Alzheimers    Diabetes Paternal Uncle     Hypertension Paternal Uncle     Hypertension Paternal Aunt     Hypertension Paternal Aunt     Breast Cancer Paternal Aunt        Social History     Socioeconomic History    Marital status:      Spouse name: Not on " file    Number of children: Not on file    Years of education: Not on file    Highest education level: Not on file   Occupational History    Not on file   Tobacco Use    Smoking status: Every Day     Current packs/day: 0.00     Types: Cigarettes     Last attempt to quit: 2018     Years since quittin.2    Smokeless tobacco: Never   Vaping Use    Vaping status: Never Used   Substance and Sexual Activity    Alcohol use: Yes     Comment: Occ    Drug use: No    Sexual activity: Yes   Other Topics Concern     Service No    Blood Transfusions No    Caffeine Concern No     Comment: none during wrestling season    Occupational Exposure No    Hobby Hazards Yes     Comment: high school wrestler    Sleep Concern No    Stress Concern No    Weight Concern No    Special Diet Yes     Comment: wrestler    Back Care No    Exercise Not Asked    Bike Helmet Yes    Seat Belt Yes    Self-Exams Not Asked    Parent/sibling w/ CABG, MI or angioplasty before 65F 55M? No   Social History Narrative    Not on file     Social Drivers of Health     Financial Resource Strain: Not on file   Food Insecurity: Not on file   Transportation Needs: Not on file   Physical Activity: Not on file   Stress: Not on file   Social Connections: Not on file   Interpersonal Safety: Low Risk  (10/21/2024)    Interpersonal Safety     Do you feel physically and emotionally safe where you currently live?: Yes     Within the past 12 months, have you been hit, slapped, kicked or otherwise physically hurt by someone?: No     Within the past 12 months, have you been humiliated or emotionally abused in other ways by your partner or ex-partner?: No   Housing Stability: Not on file       Outpatient Encounter Medications as of 2024   Medication Sig Dispense Refill    albuterol (PROAIR HFA/PROVENTIL HFA/VENTOLIN HFA) 108 (90 Base) MCG/ACT inhaler Inhale 2 puffs into the lungs 4 times daily for 7 days. 18 g 0    Ascorbic Acid (VITAMIN C) 500 MG CAPS Take 2  capsules by mouth daily      fluticasone (FLONASE ALLERGY RELIEF) 50 MCG/ACT nasal spray 1 spray in each nostril Nasally Once a day      hydrOXYzine HCl (ATARAX) 25 MG tablet Take 1 tablet (25 mg) by mouth 3 times daily as needed for itching. (Patient not taking: Reported on 10/29/2024) 60 tablet 1    multivitamin w/minerals (THERA-VIT-M) tablet Take 1 tablet by mouth daily      vitamin D3 (CHOLECALCIFEROL) 50 mcg (2000 units) tablet Take 1 tablet by mouth daily       No facility-administered encounter medications on file as of 11/26/2024.             Review Of Systems  Skin: As above  Eyes: negative  Ears/Nose/Throat: negative  Respiratory: No shortness of breath, dyspnea on exertion, cough, or hemoptysis  Cardiovascular: negative  Gastrointestinal: negative  Genitourinary: negative  Musculoskeletal: negative  Neurologic: negative  Psychiatric: negative  Hematologic/Lymphatic/Immunologic: negative  Endocrine: negative      O:   Alert & Orientedx3, Mood & Affect wnl,    General appearance normal   Alert, oriented and in no acute distress    Doing well no issues     Pulm: Breathing Normal, talking in normal sentences, no shortness of breath during conversation    Neuro/Psych: Orientation:Alert and Orientedx3 ; Mood/Affect:normal ; no anxiety or depression     A/P:  1.dermatographia and itching clear  Drop one zyrtec for 4 weeks if still clear drop on xyzal   Cont skin care  Return to clinic prn   It was a pleasure speaking to Hussain Warren today.  Previous clinic  notes and pertinent laboratory tests were reviewed prior to Hussain Warren's visit.  Skin care regimen reviewed with patient: Eliminate harsh soaps, i.e. Dial, zest, irsih spring; Mild soaps such as Cetaphil or Dove sensitive skin, avoid hot or cold showers, aggressive use of emollients including vanicream, cetaphil or cerave discussed with patient.      Teledermatology information:  - Location of patient: home  - Location of teledermatologist: [facility]   -  Reason teledermatology is appropriate: of National Emergency Regarding Coronavirus disease (COVID 19) Outbreak  - The patient's condition can safely be assessed using telemedicine: yes  - Method of transmission: store and forward teledermatology  - In-person dermatology visit recommendation: no  - Service start time:1015am/pm  - Service end time:1028am/pm  - Date of report: 11/26/24

## 2025-01-18 ENCOUNTER — OFFICE VISIT (OUTPATIENT)
Dept: URGENT CARE | Facility: URGENT CARE | Age: 35
End: 2025-01-18
Payer: COMMERCIAL

## 2025-01-18 VITALS
SYSTOLIC BLOOD PRESSURE: 145 MMHG | HEART RATE: 77 BPM | BODY MASS INDEX: 37.1 KG/M2 | OXYGEN SATURATION: 98 % | TEMPERATURE: 97.9 F | DIASTOLIC BLOOD PRESSURE: 84 MMHG | RESPIRATION RATE: 18 BRPM | WEIGHT: 266 LBS

## 2025-01-18 DIAGNOSIS — F17.200 SMOKING: ICD-10-CM

## 2025-01-18 DIAGNOSIS — J04.0 LARYNGITIS: Primary | ICD-10-CM

## 2025-01-18 PROCEDURE — 99213 OFFICE O/P EST LOW 20 MIN: CPT

## 2025-01-18 RX ORDER — DEXAMETHASONE 4 MG/1
4 TABLET ORAL 2 TIMES DAILY WITH MEALS
Qty: 6 TABLET | Refills: 0 | Status: SHIPPED | OUTPATIENT
Start: 2025-01-18 | End: 2025-01-21

## 2025-01-18 NOTE — PROGRESS NOTES
"URGENT CARE  Assessment & Plan   Assessment:   Hussain Warren is a 34 year old male who's clinical presentation today is consistent with:   1. Laryngitis    - dexAMETHasone (DECADRON) 4 MG tablet;   2. Smoking  Plan:  Discussed with the patient that we can try steroids to help with his laryngitis, I recommend he stop smoking, follow-up is as needed if symptoms do not improve in the next week, sooner if symptoms worsen, return precautions given.  No alarm signs or symptoms present   Differential Diagnoses for this patient's chief complaint that I considered include:  Bacterial vs viral etiology of pharyngitis, peritonsillar abscess and cellulitis, Tonsillitis, mono, Ihsan's angina, retropharyngeal abscess, or deep space neck infection      Patient is agreeable to treatment plan and state they will follow-up if symptoms do not improve and/or if symptoms worsen   see patient's AVS 'monitor for' section for specific patient instructions given and discussed regarding what to watch for and when to follow up    ANAYA Velasco [facility] [place]    ______________________________________________________________________      Subjective     HPI: Hussain Warren \"Manuel\"} is a 34 year old  male who presents today for evaluation the following concerns:   Patient presents today endorsing he was sick with what he thinks was influenza a few days ago, patient states his entire family had influenza A, at that time patient states he had a fever, runny nose and mild cough, patient states all his symptoms resolved except he is having a hoarse raspy voice.  Patient presents today due to loss of voice stating he needs his voice back for work.  Patient denies a sore throat or any fevers currently.  Patient denies any coughing currently.  Patient states he does smoke, declines swabbing     Review of Systems:  Pertinent review of systems as reflected in HPI, otherwise negative.     Objective    Physical " Exam:  Vitals:    01/18/25 1437   BP: (!) 145/84   Pulse: 77   Resp: 18   Temp: 97.9  F (36.6  C)   TempSrc: Tympanic   SpO2: 98%   Weight: 120.7 kg (266 lb)      General: Alert and oriented, no acute distress, Vital signs reviewed: afebrile  Psy/mental status: Cooperative, nonanxious  SKIN: Intact, no rashes  EYES: EOMs intact, PERRLA bilaterally   Conjunctiva: Clear bilaterally, no injection or erythema present  EARS: TMs intact, translucent gray in color with normal landmarks present no erythema  or bulging tympanic membrane   Canals are without swelling, however have a mild amount of cerumen, no impaction  NOSE:  mucosa moist}               No frontal or maxillary sinus tenderness present bilaterally  MOUTH/THROAT: lips, tongue, & oral mucosa appear normal upon inspection                Posterior oropharynx is erythematous but without exudate, lesions or tonsillar  Edema, no dysphonia, no unilateral tonsillar edema, no uvular deviation,   no signs of peritonsillar abscess  NECK: supple, has full range of motion with no meningeal signs              No lymphadenopathy present  LUNG: normal work of breathing, good respiratory effort without retractions, good air  movement, non labored, inspection reveals normal chest expansion w/  inspiration            Lung sounds are clear to auscultation bilaterally,            No rales/rhonic/crackles wheezing noted           No cough noted     ______________________________________________________________________    I explained my diagnostic considerations and recommendations to the patient, who voiced understanding and agreement with the treatment plan.   All questions were answered.   We discussed potential side effects, risks and benefits of any prescribed or recommended therapies, as well as expectations for response to treatments.  Please see AVS for any patient instructions & handouts given.   Patient was advised to contact the Nurse Care Line, their Primary Care  provider, Urgent Care, or the Emergency Department if there are new or worsening symptoms, or call 911 for emergencies.

## 2025-01-18 NOTE — PATIENT INSTRUCTIONS
Laryngitis       Laryngitis is an inflammation of the voice box (larynx) that causes your voice to become raspy or hoarse.   Most of the time, laryngitis comes on quickly and lasts as long as 2 weeks.   It is caused by overuse, irritation, or infection of the vocal cords inside the larynx.  Some of the most common causes are a cold, the flu, or allergies.   Loud talking, shouting, cheering, or singing also can cause laryngitis.   Stomach acid that backs up into the throat also can make you lose your voice.  Resting your voice and taking other steps at home can help you get your voice back.    plan   can try steroids   Rest your voice. You do not have to stop speaking, but use your voice as little as possible. Speak softly but do not whisper; whispering can bother your larynx more than speaking softly. Avoid talking on the telephone or trying to speak loudly.  Drink plenty of water to keep your throat moist.  Before you use cough and cold medicines, check the label. They may not be safe for young children or for people with certain health problems.  Try to keep stomach acid from backing up into your throat.   Do not eat just before bedtime.   Reduce the amount of coffee and alcohol you drink, and eat healthy foods.   Taking over-the-counter acid reducers can help when these steps are not enough.   Try not to clear your throat. This can cause more irritation of your larynx.   Decrease coughing; Take an over-the-counter cough suppressant (if your doctor recommends it) if you have a dry cough that does not produce mucus.  Use saline (saltwater) nasal washes and or gargles   Do not smoke or allow others to smoke around you.   This will cause throat irritation and worsen laryngitis   Monitor for:   - trouble breathing   - feelings of shortness of breath   - pain is getting worse   - worsening trouble swallowing   - coughing up any blood   - new or worsening fevers

## 2025-01-26 ENCOUNTER — OFFICE VISIT (OUTPATIENT)
Dept: URGENT CARE | Facility: URGENT CARE | Age: 35
End: 2025-01-26
Payer: COMMERCIAL

## 2025-01-26 VITALS
SYSTOLIC BLOOD PRESSURE: 127 MMHG | WEIGHT: 268.13 LBS | BODY MASS INDEX: 37.4 KG/M2 | TEMPERATURE: 97.6 F | HEART RATE: 87 BPM | OXYGEN SATURATION: 98 % | DIASTOLIC BLOOD PRESSURE: 89 MMHG

## 2025-01-26 DIAGNOSIS — H72.91 ACUTE OTITIS MEDIA OF RIGHT EAR WITH PERFORATED TYMPANIC MEMBRANE: Primary | ICD-10-CM

## 2025-01-26 DIAGNOSIS — H66.91 ACUTE OTITIS MEDIA OF RIGHT EAR WITH PERFORATED TYMPANIC MEMBRANE: Primary | ICD-10-CM

## 2025-01-26 DIAGNOSIS — Z72.0 TOBACCO USE: ICD-10-CM

## 2025-01-26 PROCEDURE — 99213 OFFICE O/P EST LOW 20 MIN: CPT | Performed by: FAMILY MEDICINE

## 2025-01-26 RX ORDER — CIPROFLOXACIN AND DEXAMETHASONE 3; 1 MG/ML; MG/ML
4 SUSPENSION/ DROPS AURICULAR (OTIC) 2 TIMES DAILY
Qty: 7.5 ML | Refills: 0 | Status: SHIPPED | OUTPATIENT
Start: 2025-01-26 | End: 2025-02-02

## 2025-01-26 RX ORDER — HYDROCODONE BITARTRATE AND ACETAMINOPHEN 5; 325 MG/1; MG/1
1 TABLET ORAL
Qty: 5 TABLET | Refills: 0 | Status: SHIPPED | OUTPATIENT
Start: 2025-01-26 | End: 2025-01-31

## 2025-01-26 ASSESSMENT — PAIN SCALES - GENERAL: PAINLEVEL_OUTOF10: SEVERE PAIN (10)

## 2025-01-26 NOTE — PROGRESS NOTES
Assessment & Plan       ICD-10-CM    1. Acute otitis media of right ear with perforated tympanic membrane  H66.91 ciprofloxacin-dexAMETHasone (CIPRODEX) 0.3-0.1 % otic suspension    H72.91 HYDROcodone-acetaminophen (NORCO) 5-325 MG tablet      2. Tobacco use  Z72.0          I reassured Manuel that based on my exam today and comparing to prior providers notes, I suspect this is a moderate improvement already.  The inferior half of the TM actually looks normal but there is still some erythema across the top which is likely the cause of his pain.  He still having significant symptoms and it sounds like there was concern about the mastoid being inflamed but at this point I see no evidence for abscess, or other serious infection.  I did agree to give him some Norco to use at night for 5 days if needed for pain but reassured him that this should continue to improve over the next few days and his ear should be clear within 2 weeks.    Also I had like him to try again to get the Ciprodex because the dexamethasone portion may help with inflammation.  I asked him to check with the pharmacy if they could order it and have it with available within a day or 2.  In the meantime we will continue on the ofloxacin that he has now.  The amoxicillin should help with that as well.    We talked about smoking and he is already working on cutting back and has a goal to quit and I encouraged him to follow that plan. Advised him on symptoms that would warrant return, such as unilateral swelling/mass in the throat or neck, high fever, inability to swallow, SOB. Otherwise I encourage him to give this a little longer and complete the antibiotics.     Chantel Rocha MD  [facility] [place]    Subjective     Manuel is a 34 year old male who presents to clinic today for the following health issues:  Chief Complaint   Patient presents with    Ear Problem     R ear pain, was seen in urgent care last week but ear  is not getting better      HPI    He was seen here on 1/24 with right otitis, had a perforated TM and tenderness in the mastoid. He was started on amoxicillin and otic drops Ocuflox. They ordered Ciprodex but it was unavailable.    He hasn't improved, and was advised to come back if not getting better in 24 hours.   He is able to eat and drink, no respiratory symptoms. No fever since was sick with flu prior to all of this.   He can't hear well out of the right ear, left side normal.   He was also in prior to this, on 1/18, with laryngitis and was treated with decadron. That is improving, voice coming back.   He was ill with likely the flu prior to all of this, as whole family was sick.   He does smoke cigarettes, is working hard on quitting, has cut back.  He has quit in the past but then restarted due to some family stresses with a child in NICU.     7 pt ROS is otherwise negative except as noted in HPI.      Objective    /89 (BP Location: Right arm, Patient Position: Sitting, Cuff Size: Adult Regular)   Pulse 87   Temp 97.6  F (36.4  C) (Tympanic)   Wt 121.6 kg (268 lb 2 oz)   SpO2 98%   BMI 37.40 kg/m    Physical Exam   Vitals noted.  Patient alert, oriented, and in no acute distress.   Right ear canal clear. TM is erythematous across the superior half, inferior half is normal, clear and translucent without evidence for fluid/effusion. No perforation is seen.   Left TM and canal both normal.   No visible facial asymmetry or neck asymmetry. Palpation reveals tenderness in the mastoid process on the right and in the anterior tragus on the right.   No skin erythema or rash.   Neck:  Supple without lymphadenopathy, JVD or masses.   Oral:  Oropharynx nl without erythema, exudate, mass or other lesions.   CV:  RRR without murmur.   Respiratory:  Lungs clear to auscultation bilaterally.

## 2025-02-21 PROBLEM — G56.01 CARPAL TUNNEL SYNDROME OF RIGHT WRIST: Status: ACTIVE | Noted: 2025-02-21

## 2025-02-21 PROBLEM — S62.339A BOXER'S FRACTURE: Status: ACTIVE | Noted: 2025-02-21

## 2025-03-12 ENCOUNTER — ANCILLARY PROCEDURE (OUTPATIENT)
Dept: GENERAL RADIOLOGY | Facility: CLINIC | Age: 35
End: 2025-03-12
Attending: PHYSICIAN ASSISTANT
Payer: COMMERCIAL

## 2025-03-12 ENCOUNTER — OFFICE VISIT (OUTPATIENT)
Dept: URGENT CARE | Facility: URGENT CARE | Age: 35
End: 2025-03-12
Payer: COMMERCIAL

## 2025-03-12 VITALS
SYSTOLIC BLOOD PRESSURE: 130 MMHG | WEIGHT: 259 LBS | DIASTOLIC BLOOD PRESSURE: 83 MMHG | HEART RATE: 86 BPM | RESPIRATION RATE: 16 BRPM | TEMPERATURE: 98.4 F | OXYGEN SATURATION: 98 % | BODY MASS INDEX: 37.43 KG/M2

## 2025-03-12 DIAGNOSIS — S69.92XA INJURY OF LEFT HAND, INITIAL ENCOUNTER: Primary | ICD-10-CM

## 2025-03-12 DIAGNOSIS — S69.92XA INJURY OF LEFT HAND, INITIAL ENCOUNTER: ICD-10-CM

## 2025-03-12 PROCEDURE — 73130 X-RAY EXAM OF HAND: CPT | Mod: TC | Performed by: RADIOLOGY

## 2025-03-12 PROCEDURE — 3079F DIAST BP 80-89 MM HG: CPT | Performed by: PHYSICIAN ASSISTANT

## 2025-03-12 PROCEDURE — 99213 OFFICE O/P EST LOW 20 MIN: CPT | Performed by: PHYSICIAN ASSISTANT

## 2025-03-12 PROCEDURE — 3075F SYST BP GE 130 - 139MM HG: CPT | Performed by: PHYSICIAN ASSISTANT

## 2025-03-12 ASSESSMENT — ENCOUNTER SYMPTOMS
PALPITATIONS: 0
ALLERGIC/IMMUNOLOGIC NEGATIVE: 1
SORE THROAT: 0
CONSTITUTIONAL NEGATIVE: 1
SHORTNESS OF BREATH: 0
ARTHRALGIAS: 1
FREQUENCY: 0
FEVER: 0
HEADACHES: 0
DYSURIA: 0
VOMITING: 0
NAUSEA: 0
DIARRHEA: 0
HEMATURIA: 0
COUGH: 0
MYALGIAS: 1
CHILLS: 0
NEUROLOGICAL NEGATIVE: 1
ABDOMINAL PAIN: 0
CARDIOVASCULAR NEGATIVE: 1
RESPIRATORY NEGATIVE: 1
CHEST TIGHTNESS: 0
GASTROINTESTINAL NEGATIVE: 1
WHEEZING: 0

## 2025-03-12 NOTE — PROGRESS NOTES
Chief Complaint:     Chief Complaint   Patient presents with    Hand Injury     Today while working out pt was hitting punching a bag and injured his left hand.  Lateral side of left hand is painful and swollen.      ASSESSMENT     1. Injury of left hand, initial encounter         PLAN    XR of the L hand was negative for any acute fracture per my read.  RICE discussed  Recommended rest and avoidance of activities which cause pain or swelling.  Pain relief: Acetaminophen and or Ibuprofen with food.  Patient verbalized understanding, and agrees with this plan.    HPI: Hussain Warren is an 34 year old male who presents today for evaluation of L hand injury.  Onset of the injury was earlier today when he began having pain while hitting a punching bag.  He complains of pinky finger metacarpal pain with movement.    Patient denies any numbness, tingling, or dysfunction of the L hand.    ROS:      Review of Systems   Constitutional: Negative.  Negative for chills and fever.   HENT: Negative.  Negative for sore throat.    Respiratory: Negative.  Negative for cough, chest tightness, shortness of breath and wheezing.    Cardiovascular: Negative.  Negative for chest pain and palpitations.   Gastrointestinal: Negative.  Negative for abdominal pain, diarrhea, nausea and vomiting.   Genitourinary:  Negative for dysuria, frequency, hematuria and urgency.   Musculoskeletal:  Positive for arthralgias and myalgias.   Skin:  Negative for rash.   Allergic/Immunologic: Negative.  Negative for immunocompromised state.   Neurological: Negative.  Negative for headaches.        Problem history  Patient Active Problem List   Diagnosis    Other acne    Anxiety    Insomnia    ADHD (attention deficit hyperactivity disorder)    Tobacco use    Corneal ulcer of left eye    Boxer's fracture    Carpal tunnel syndrome of right wrist        Allergies  No Known Allergies     Smoking History  History   Smoking Status    Every Day    Types: Cigarettes    Smokeless Tobacco    Never      Current Meds    Current Outpatient Medications:     Ascorbic Acid (VITAMIN C) 500 MG CAPS, Take 2 capsules by mouth daily, Disp: , Rfl:     fluticasone (FLONASE) 50 MCG/ACT nasal spray, Spray 1 spray into both nostrils daily., Disp: 9.9 mL, Rfl: 0    multivitamin w/minerals (THERA-VIT-M) tablet, Take 1 tablet by mouth daily, Disp: , Rfl:     vitamin D3 (CHOLECALCIFEROL) 50 mcg (2000 units) tablet, Take 1 tablet by mouth daily, Disp: , Rfl:     ofloxacin (OCUFLOX) 0.3 % ophthalmic solution, Place 1-2 drops into the right ear daily. (Patient not taking: Reported on 3/12/2025), Disp: 5 mL, Rfl: 0        Vital signs reviewed by Bran Egan PA-C  /83   Pulse 86   Temp 98.4  F (36.9  C) (Tympanic)   Resp 16   Wt 117.5 kg (259 lb)   SpO2 98%   BMI 37.43 kg/m      Physical Exam     Physical Exam  Vitals and nursing note reviewed.   Constitutional:       General: He is not in acute distress.     Appearance: He is well-developed. He is not ill-appearing, toxic-appearing or diaphoretic.   HENT:      Head: Normocephalic and atraumatic.      Right Ear: Hearing, tympanic membrane, ear canal and external ear normal. Tympanic membrane is not perforated, erythematous, retracted or bulging.      Left Ear: Hearing, tympanic membrane, ear canal and external ear normal. Tympanic membrane is not perforated, erythematous, retracted or bulging.      Nose: Nose normal. No mucosal edema, congestion or rhinorrhea.      Mouth/Throat:      Pharynx: No oropharyngeal exudate or posterior oropharyngeal erythema.      Tonsils: No tonsillar exudate or tonsillar abscesses. 0 on the right. 0 on the left.   Eyes:      Pupils: Pupils are equal, round, and reactive to light.   Cardiovascular:      Rate and Rhythm: Normal rate and regular rhythm.      Heart sounds: Normal heart sounds, S1 normal and S2 normal. Heart sounds not distant. No murmur heard.     No friction rub. No gallop.   Pulmonary:       Effort: Pulmonary effort is normal. No respiratory distress.      Breath sounds: Normal breath sounds. No decreased breath sounds, wheezing, rhonchi or rales.   Abdominal:      General: Bowel sounds are normal. There is no distension.      Palpations: Abdomen is soft.      Tenderness: There is no abdominal tenderness.   Musculoskeletal:      Left hand: Tenderness and bony tenderness present. No swelling. Normal range of motion.        Hands:       Cervical back: Normal range of motion and neck supple.   Lymphadenopathy:      Cervical: No cervical adenopathy.   Skin:     General: Skin is warm and dry.      Findings: No rash.   Neurological:      Mental Status: He is alert.      Cranial Nerves: No cranial nerve deficit.   Psychiatric:         Attention and Perception: He is attentive.         Speech: Speech normal.         Behavior: Behavior normal. Behavior is cooperative.         Thought Content: Thought content normal.         Judgment: Judgment normal.             Bran Egan PA-C  3/12/2025, 1:55 PM